# Patient Record
Sex: FEMALE | Race: WHITE | NOT HISPANIC OR LATINO | URBAN - METROPOLITAN AREA
[De-identification: names, ages, dates, MRNs, and addresses within clinical notes are randomized per-mention and may not be internally consistent; named-entity substitution may affect disease eponyms.]

---
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
## 2020-05-05 DIAGNOSIS — Z01.419 ENCOUNTER FOR GYNECOLOGICAL EXAMINATION WITHOUT ABNORMAL FINDING: ICD-10-CM

## 2020-05-06 ENCOUNTER — TELEPHONE (OUTPATIENT)
Dept: OBGYN CLINIC | Facility: CLINIC | Age: 20
End: 2020-05-06

## 2020-05-06 RX ORDER — NORETHINDRONE ACETATE AND ETHINYL ESTRADIOL, AND FERROUS FUMARATE 1MG-20(24)
KIT ORAL
Qty: 84 CAPSULE | Refills: 0 | Status: SHIPPED | OUTPATIENT
Start: 2020-05-06 | End: 2020-06-15 | Stop reason: SDUPTHER

## 2020-06-15 ENCOUNTER — ANNUAL EXAM (OUTPATIENT)
Dept: OBGYN CLINIC | Facility: MEDICAL CENTER | Age: 20
End: 2020-06-15
Payer: COMMERCIAL

## 2020-06-15 VITALS
DIASTOLIC BLOOD PRESSURE: 72 MMHG | BODY MASS INDEX: 20.16 KG/M2 | WEIGHT: 121 LBS | SYSTOLIC BLOOD PRESSURE: 110 MMHG | HEIGHT: 65 IN

## 2020-06-15 DIAGNOSIS — Z01.419 ENCOUNTER FOR GYNECOLOGICAL EXAMINATION WITHOUT ABNORMAL FINDING: Primary | ICD-10-CM

## 2020-06-15 DIAGNOSIS — Z30.41 ENCOUNTER FOR SURVEILLANCE OF CONTRACEPTIVE PILLS: ICD-10-CM

## 2020-06-15 PROCEDURE — 99395 PREV VISIT EST AGE 18-39: CPT | Performed by: NURSE PRACTITIONER

## 2020-06-15 RX ORDER — NORETHINDRONE ACETATE AND ETHINYL ESTRADIOL, AND FERROUS FUMARATE 1MG-20(24)
KIT ORAL
Qty: 84 CAPSULE | Refills: 3 | Status: SHIPPED | OUTPATIENT
Start: 2020-06-15 | End: 2021-06-21 | Stop reason: SDUPTHER

## 2021-01-25 PROBLEM — Z01.419 ENCOUNTER FOR GYNECOLOGICAL EXAMINATION WITHOUT ABNORMAL FINDING: Status: RESOLVED | Noted: 2019-06-10 | Resolved: 2021-01-25

## 2021-01-25 PROBLEM — Z30.41 ENCOUNTER FOR SURVEILLANCE OF CONTRACEPTIVE PILLS: Status: RESOLVED | Noted: 2020-06-15 | Resolved: 2021-01-25

## 2021-01-26 ENCOUNTER — OFFICE VISIT (OUTPATIENT)
Dept: FAMILY MEDICINE CLINIC | Facility: CLINIC | Age: 21
End: 2021-01-26
Payer: COMMERCIAL

## 2021-01-26 ENCOUNTER — APPOINTMENT (OUTPATIENT)
Dept: LAB | Facility: CLINIC | Age: 21
End: 2021-01-26
Payer: COMMERCIAL

## 2021-01-26 VITALS
HEART RATE: 86 BPM | HEIGHT: 65 IN | BODY MASS INDEX: 20.23 KG/M2 | SYSTOLIC BLOOD PRESSURE: 122 MMHG | WEIGHT: 121.4 LBS | RESPIRATION RATE: 18 BRPM | TEMPERATURE: 98.1 F | OXYGEN SATURATION: 99 % | DIASTOLIC BLOOD PRESSURE: 68 MMHG

## 2021-01-26 DIAGNOSIS — R19.7 DIARRHEA, UNSPECIFIED TYPE: Primary | ICD-10-CM

## 2021-01-26 DIAGNOSIS — R63.4 WEIGHT LOSS: ICD-10-CM

## 2021-01-26 DIAGNOSIS — R10.30 LOWER ABDOMINAL PAIN: ICD-10-CM

## 2021-01-26 DIAGNOSIS — R19.7 DIARRHEA, UNSPECIFIED TYPE: ICD-10-CM

## 2021-01-26 LAB
ALBUMIN SERPL BCP-MCNC: 3.8 G/DL (ref 3.5–5)
ALP SERPL-CCNC: 45 U/L (ref 46–116)
ALT SERPL W P-5'-P-CCNC: 32 U/L (ref 12–78)
ANION GAP SERPL CALCULATED.3IONS-SCNC: 6 MMOL/L (ref 4–13)
AST SERPL W P-5'-P-CCNC: 17 U/L (ref 5–45)
BASOPHILS # BLD AUTO: 0.04 THOUSANDS/ΜL (ref 0–0.1)
BASOPHILS NFR BLD AUTO: 1 % (ref 0–1)
BILIRUB SERPL-MCNC: 0.24 MG/DL (ref 0.2–1)
BUN SERPL-MCNC: 10 MG/DL (ref 5–25)
CALCIUM SERPL-MCNC: 9.7 MG/DL (ref 8.3–10.1)
CHLORIDE SERPL-SCNC: 111 MMOL/L (ref 100–108)
CO2 SERPL-SCNC: 25 MMOL/L (ref 21–32)
CREAT SERPL-MCNC: 0.97 MG/DL (ref 0.6–1.3)
EOSINOPHIL # BLD AUTO: 0.03 THOUSAND/ΜL (ref 0–0.61)
EOSINOPHIL NFR BLD AUTO: 1 % (ref 0–6)
ERYTHROCYTE [DISTWIDTH] IN BLOOD BY AUTOMATED COUNT: 12.2 % (ref 11.6–15.1)
GFR SERPL CREATININE-BSD FRML MDRD: 84 ML/MIN/1.73SQ M
GLUCOSE P FAST SERPL-MCNC: 99 MG/DL (ref 65–99)
HCT VFR BLD AUTO: 42.4 % (ref 34.8–46.1)
HGB BLD-MCNC: 13.9 G/DL (ref 11.5–15.4)
IMM GRANULOCYTES # BLD AUTO: 0.02 THOUSAND/UL (ref 0–0.2)
IMM GRANULOCYTES NFR BLD AUTO: 0 % (ref 0–2)
LYMPHOCYTES # BLD AUTO: 3.05 THOUSANDS/ΜL (ref 0.6–4.47)
LYMPHOCYTES NFR BLD AUTO: 47 % (ref 14–44)
MCH RBC QN AUTO: 28.9 PG (ref 26.8–34.3)
MCHC RBC AUTO-ENTMCNC: 32.8 G/DL (ref 31.4–37.4)
MCV RBC AUTO: 88 FL (ref 82–98)
MONOCYTES # BLD AUTO: 0.35 THOUSAND/ΜL (ref 0.17–1.22)
MONOCYTES NFR BLD AUTO: 5 % (ref 4–12)
NEUTROPHILS # BLD AUTO: 2.94 THOUSANDS/ΜL (ref 1.85–7.62)
NEUTS SEG NFR BLD AUTO: 46 % (ref 43–75)
NRBC BLD AUTO-RTO: 0 /100 WBCS
PLATELET # BLD AUTO: 214 THOUSANDS/UL (ref 149–390)
PMV BLD AUTO: 12 FL (ref 8.9–12.7)
POTASSIUM SERPL-SCNC: 3.3 MMOL/L (ref 3.5–5.3)
PROT SERPL-MCNC: 7.1 G/DL (ref 6.4–8.2)
RBC # BLD AUTO: 4.81 MILLION/UL (ref 3.81–5.12)
SODIUM SERPL-SCNC: 142 MMOL/L (ref 136–145)
WBC # BLD AUTO: 6.43 THOUSAND/UL (ref 4.31–10.16)

## 2021-01-26 PROCEDURE — 3008F BODY MASS INDEX DOCD: CPT | Performed by: FAMILY MEDICINE

## 2021-01-26 PROCEDURE — 36415 COLL VENOUS BLD VENIPUNCTURE: CPT

## 2021-01-26 PROCEDURE — 85025 COMPLETE CBC W/AUTO DIFF WBC: CPT

## 2021-01-26 PROCEDURE — 80053 COMPREHEN METABOLIC PANEL: CPT

## 2021-01-26 PROCEDURE — 99204 OFFICE O/P NEW MOD 45 MIN: CPT | Performed by: FAMILY MEDICINE

## 2021-01-26 PROCEDURE — 3725F SCREEN DEPRESSION PERFORMED: CPT | Performed by: FAMILY MEDICINE

## 2021-01-26 PROCEDURE — 1036F TOBACCO NON-USER: CPT | Performed by: FAMILY MEDICINE

## 2021-01-26 NOTE — PROGRESS NOTES
Kathie Pearl 2000 female MRN: [id number]      ASSESSMENT/PLAN  Problem List Items Addressed This Visit     None      Visit Diagnoses     Diarrhea, unspecified type    -  Primary    Relevant Orders    Ova and parasite examination    Clostridium difficile toxin by PCR    Stool culture    Comprehensive metabolic panel    H  pylori antigen, stool    CBC and differential    Lower abdominal pain        Relevant Orders    Ova and parasite examination    Clostridium difficile toxin by PCR    Stool culture    Comprehensive metabolic panel    H  pylori antigen, stool    CBC and differential    Weight loss        Relevant Orders    Ova and parasite examination    Clostridium difficile toxin by PCR    Stool culture    Comprehensive metabolic panel    H  pylori antigen, stool    CBC and differential        Wide differential for abdominal pain/diarrhea including IBD, IBS  No acute abdomen on exam  Pt states her weight is now stable around 120 lbs  Given persistent diarrhea, will check stools studies  If benign, will refer to GI for further evaluation/possible scope  Future Appointments   Date Time Provider Vicky Proctor   1/26/2021  8:40 AM TANISHA TRUJILLO [facility] TANISHA HODGES   6/21/2021  1:00 PM [facility]          SUBJECTIVE  CC: Establish Care and Abdominal Pain (for about 2 months )      HPI:  Kathie Pearl is a 21 y o  female who presents with her Mom to establish care  History reviewed and updated as below  October -- had some "weird pain" in her stomach, went to the ED  It ended up "being nothing"  November -- had stomach pain x1 week  Since then, off and on pain and diarrhea  Has lost some weight (fluctuating 4-5 lb)  Pain occurs across lower abdomen  Pretty much everyday -- sometimes just an hour, sometimes all day  Immediately has to have a BM after eating  No change with certain foods  Pain improves when laying down    No new changes in medication or diet  Started when she was at school, but she is now home at her Dad's house  Sleeping more than usual        Review of Systems   Constitutional: Positive for unexpected weight change  Negative for fever  HENT: Negative for congestion, ear pain, rhinorrhea and sore throat  Eyes: Negative for visual disturbance  Respiratory: Negative for cough and shortness of breath  Cardiovascular: Negative for chest pain, palpitations and leg swelling  Gastrointestinal: Positive for abdominal pain and diarrhea  Negative for blood in stool and constipation  Endocrine: Negative for polyuria  Genitourinary: Negative for dysuria and menstrual problem  Neurological: Negative for dizziness and light-headedness (when she stands up -- gets black vision)  Psychiatric/Behavioral: Negative for sleep disturbance         Historical Information   The patient history was reviewed and updated as follows:    Past Medical History:   Diagnosis Date    Migraine headache      Past Surgical History:   Procedure Laterality Date    EYE SURGERY      FASCIOTOMY Bilateral     legs     Family History   Problem Relation Age of Onset    No Known Problems Mother     Asthma Father     Allergic rhinitis Father     No Known Problems Brother     Heart attack Maternal Grandmother       Social History   Social History     Substance and Sexual Activity   Alcohol Use Yes     Social History     Substance and Sexual Activity   Drug Use No     Social History     Tobacco Use   Smoking Status Never Smoker   Smokeless Tobacco Never Used       Medications:     Current Outpatient Medications:     Norethin Ace-Eth Estrad-FE (Taytulla) 1-20 MG-MCG(24) CAPS, take 1 tablet by mouth once daily, Disp: 84 capsule, Rfl: 3  No Known Allergies    OBJECTIVE    Vitals:   Vitals:    01/26/21 0756   BP: 122/68   BP Location: Left arm   Patient Position: Sitting   Cuff Size: Standard   Pulse: 86   Resp: 18   Temp: 98 1 °F (36 7 °C) SpO2: 99%   Weight: 55 1 kg (121 lb 6 4 oz)   Height: 5' 4 75" (1 645 m)           Physical Exam  Vitals signs and nursing note reviewed  Constitutional:       General: She is not in acute distress  Appearance: Normal appearance  HENT:      Head: Normocephalic and atraumatic  Right Ear: Tympanic membrane and ear canal normal       Left Ear: Tympanic membrane and ear canal normal       Nose: Nose normal       Mouth/Throat:      Mouth: Mucous membranes are moist       Pharynx: No oropharyngeal exudate or posterior oropharyngeal erythema  Eyes:      Conjunctiva/sclera: Conjunctivae normal    Cardiovascular:      Rate and Rhythm: Normal rate and regular rhythm  Pulmonary:      Effort: Pulmonary effort is normal  No respiratory distress  Breath sounds: Normal breath sounds  Abdominal:      General: Bowel sounds are normal  There is no distension  Palpations: Abdomen is soft  Tenderness: There is no abdominal tenderness  Musculoskeletal:      Right lower leg: No edema  Left lower leg: No edema  Lymphadenopathy:      Cervical: No cervical adenopathy  Skin:     General: Skin is warm and dry  Neurological:      General: No focal deficit present  Mental Status: She is alert     Psychiatric:         Mood and Affect: Mood normal                     DO Blayne Fagan [address] [facility]   1/26/2021  8:37 AM

## 2021-01-27 ENCOUNTER — APPOINTMENT (OUTPATIENT)
Dept: LAB | Facility: MEDICAL CENTER | Age: 21
End: 2021-01-27
Payer: COMMERCIAL

## 2021-01-27 DIAGNOSIS — R19.7 DIARRHEA, UNSPECIFIED TYPE: ICD-10-CM

## 2021-01-27 DIAGNOSIS — R63.4 WEIGHT LOSS: ICD-10-CM

## 2021-01-27 DIAGNOSIS — R10.30 LOWER ABDOMINAL PAIN: ICD-10-CM

## 2021-01-27 PROCEDURE — 87177 OVA AND PARASITES SMEARS: CPT

## 2021-01-27 PROCEDURE — 87338 HPYLORI STOOL AG IA: CPT

## 2021-01-27 PROCEDURE — 87209 SMEAR COMPLEX STAIN: CPT

## 2021-01-27 PROCEDURE — 87505 NFCT AGENT DETECTION GI: CPT

## 2021-01-28 DIAGNOSIS — R19.7 DIARRHEA, UNSPECIFIED TYPE: ICD-10-CM

## 2021-01-28 DIAGNOSIS — R10.30 LOWER ABDOMINAL PAIN: Primary | ICD-10-CM

## 2021-01-28 LAB
C DIFF TOX B TCDB STL QL NAA+PROBE: NEGATIVE
CAMPYLOBACTER DNA SPEC NAA+PROBE: NORMAL
H PYLORI AG STL QL IA: NEGATIVE
O+P STL CONC: NORMAL
SALMONELLA DNA SPEC QL NAA+PROBE: NORMAL
SHIGA TOXIN STX GENE SPEC NAA+PROBE: NORMAL
SHIGELLA DNA SPEC QL NAA+PROBE: NORMAL

## 2021-06-21 ENCOUNTER — ANNUAL EXAM (OUTPATIENT)
Dept: OBGYN CLINIC | Facility: MEDICAL CENTER | Age: 21
End: 2021-06-21
Payer: COMMERCIAL

## 2021-06-21 VITALS — BODY MASS INDEX: 20.79 KG/M2 | SYSTOLIC BLOOD PRESSURE: 104 MMHG | DIASTOLIC BLOOD PRESSURE: 70 MMHG | WEIGHT: 124 LBS

## 2021-06-21 DIAGNOSIS — Z01.419 ENCOUNTER FOR GYNECOLOGICAL EXAMINATION (GENERAL) (ROUTINE) WITHOUT ABNORMAL FINDINGS: Primary | ICD-10-CM

## 2021-06-21 DIAGNOSIS — Z01.419 ENCOUNTER FOR GYNECOLOGICAL EXAMINATION WITHOUT ABNORMAL FINDING: ICD-10-CM

## 2021-06-21 DIAGNOSIS — Z30.41 ENCOUNTER FOR SURVEILLANCE OF CONTRACEPTIVE PILLS: ICD-10-CM

## 2021-06-21 PROCEDURE — 99395 PREV VISIT EST AGE 18-39: CPT | Performed by: NURSE PRACTITIONER

## 2021-06-21 PROCEDURE — 1036F TOBACCO NON-USER: CPT | Performed by: NURSE PRACTITIONER

## 2021-06-21 RX ORDER — NORETHINDRONE ACETATE AND ETHINYL ESTRADIOL, AND FERROUS FUMARATE 1MG-20(24)
KIT ORAL
Qty: 84 CAPSULE | Refills: 3 | Status: SHIPPED | OUTPATIENT
Start: 2021-06-21 | End: 2022-05-19 | Stop reason: SDUPTHER

## 2021-06-21 NOTE — PROGRESS NOTES
Encounter for gynecological examination (general) (routine) without abnormal findings  Normal findings on routine annual gyn exam  Discussed adolescent breast health recommendations, breast awareness and self exam  Reviewed ASCCP guidelines and advised baseline pap at age 24  The patient reports she has completed Gardasil series  STI testing was offered and declined at this time; the patient is aware that condoms are recommended for all sexual contact for prevention of STI and she may seek testing at any time  Reviewed diet/activity recommendations and reasons to call  F/u as needed or in one year for routine annual gyn exam      Encounter for surveillance of contraceptive pills    Happy with current OCP  Denies ACHES, breakthrough bleeding, nausea or other side effects  Refill given for one year  RTO 1 year or prn problems or concerns  Diagnoses and all orders for this visit:    Encounter for gynecological examination (general) (routine) without abnormal findings    Encounter for surveillance of contraceptive pills  -     Norethin Ace-Eth Estrad-FE (Taytulla) 1-20 MG-MCG(24) CAPS; take 1 tablet by mouth once daily    Encounter for gynecological examination without abnormal finding         Geraldine Lemon  2000      CC:  Yearly exam    S: Cande Abbott is a  21 y o  female here for yearly exam  She has no complaints  Her cycles are regular, not heavy or painful on OCP  Sexual activity: She is sexually active and uses OCP for contraception  STD testing: She does not want STD testing today       Gardasil: She has completed the Gardasil series        She is attending [facility] in [facility] physical therapy and doing an externship @ [place] [employer] [id number] in [place]       Current Outpatient Medications:     Norethin Ace-Eth Estrad-FE (Taytulla) 1-20 MG-MCG(24) CAPS, take 1 tablet by mouth once daily, Disp: 84 capsule, Rfl: 3  Social History     Socioeconomic History    Marital status: Single Spouse name: Not on file    Number of children: Not on file    Years of education: Not on file    Highest education level: Not on file   Occupational History    Occupation: Student   Tobacco Use    Smoking status: Never Smoker    Smokeless tobacco: Never Used   Substance and Sexual Activity    Alcohol use: Yes    Drug use: No    Sexual activity: Yes     Partners: Male     Birth control/protection: Pill   Other Topics Concern    Not on file   Social History Narrative    Lives with Dad     [address] [place] [address] Strain:     Difficulty of Paying Living Expenses:    Food Insecurity:     Worried About [address] in the Last Year:     [address] in the Last Year:    Transportation Needs:     Lack of Transportation (Medical):  Lack of Transportation (Non-Medical):    Physical Activity:     Days of Exercise per Week:     Minutes of Exercise per Session:    Stress:     Feeling of Stress :    Social Connections:     Frequency of Communication with Friends and Family:     Frequency of Social Gatherings with Friends and Family:     Attends Religion Services:     Active Member of Clubs or Organizations:     Attends Club or Organization Meetings:     Marital Status:    Intimate Partner Violence:     Fear of Current or Ex-Partner:     Emotionally Abused:     Physically Abused:     Sexually Abused:      Family History   Problem Relation Age of Onset    No Known Problems Mother     Asthma Father     Allergic rhinitis Father     No Known Problems Brother     Heart attack Maternal Grandmother      Past Medical History:   Diagnosis Date    Migraine headache        Review of Systems   Constitutional: Negative for appetite change, fatigue and unexpected weight change  Respiratory: Negative for shortness of breath  Cardiovascular: Negative for chest pain and leg swelling     Breasts:  negative for tenderness or masses  Gastrointestinal: Negative for abdominal pain  Endocrine: Negative for cold intolerance and heat intolerance  Genitourinary: Negative for dyspareunia, dysuria, flank pain, frequency, genital sores, hematuria, menstrual problem and pelvic pain  Musculoskeletal: Negative for back pain  Neurological: Negative for headaches  O:  Blood pressure 104/70, weight 56 2 kg (124 lb), last menstrual period 05/25/2021  Patient appears well and is not in distress  ROM normal   Neck is supple without masses  Normal thyroid  Heart regular rate and rhythm  Capillary refill < 2 seconds   Lungs CTA bilaterally   Breasts are symmetrical without mass, tenderness, nipple discharge, skin changes or adenopathy  Abdomen is soft and nontender without masses     Skin warm and dry  Affect normal   Alert and oriented x 3

## 2021-12-21 PROBLEM — Z30.41 ENCOUNTER FOR SURVEILLANCE OF CONTRACEPTIVE PILLS: Status: RESOLVED | Noted: 2021-06-21 | Resolved: 2021-12-21

## 2021-12-21 PROBLEM — Z01.419 ENCOUNTER FOR GYNECOLOGICAL EXAMINATION (GENERAL) (ROUTINE) WITHOUT ABNORMAL FINDINGS: Status: RESOLVED | Noted: 2021-06-21 | Resolved: 2021-12-21

## 2021-12-22 ENCOUNTER — OFFICE VISIT (OUTPATIENT)
Dept: FAMILY MEDICINE CLINIC | Facility: CLINIC | Age: 21
End: 2021-12-22
Payer: COMMERCIAL

## 2021-12-22 VITALS
SYSTOLIC BLOOD PRESSURE: 112 MMHG | BODY MASS INDEX: 20.83 KG/M2 | HEIGHT: 65 IN | WEIGHT: 125 LBS | DIASTOLIC BLOOD PRESSURE: 78 MMHG | HEART RATE: 84 BPM | OXYGEN SATURATION: 96 % | TEMPERATURE: 99.6 F

## 2021-12-22 DIAGNOSIS — F41.9 ANXIETY: Primary | ICD-10-CM

## 2021-12-22 PROCEDURE — 1036F TOBACCO NON-USER: CPT | Performed by: FAMILY MEDICINE

## 2021-12-22 PROCEDURE — 3008F BODY MASS INDEX DOCD: CPT | Performed by: FAMILY MEDICINE

## 2021-12-22 PROCEDURE — 99214 OFFICE O/P EST MOD 30 MIN: CPT | Performed by: FAMILY MEDICINE

## 2021-12-22 PROCEDURE — 3725F SCREEN DEPRESSION PERFORMED: CPT | Performed by: FAMILY MEDICINE

## 2021-12-22 RX ORDER — ESCITALOPRAM OXALATE 5 MG/1
5 TABLET ORAL DAILY
Qty: 30 TABLET | Refills: 1 | Status: SHIPPED | OUTPATIENT
Start: 2021-12-22 | End: 2022-01-14 | Stop reason: SDUPTHER

## 2022-01-13 PROBLEM — F41.9 ANXIETY: Status: ACTIVE | Noted: 2022-01-13

## 2022-01-14 ENCOUNTER — TELEMEDICINE (OUTPATIENT)
Dept: FAMILY MEDICINE CLINIC | Facility: CLINIC | Age: 22
End: 2022-01-14
Payer: COMMERCIAL

## 2022-01-14 DIAGNOSIS — F41.9 ANXIETY: Primary | ICD-10-CM

## 2022-01-14 PROCEDURE — 1036F TOBACCO NON-USER: CPT | Performed by: FAMILY MEDICINE

## 2022-01-14 PROCEDURE — 99214 OFFICE O/P EST MOD 30 MIN: CPT | Performed by: FAMILY MEDICINE

## 2022-01-14 RX ORDER — ESCITALOPRAM OXALATE 10 MG/1
10 TABLET ORAL DAILY
Qty: 30 TABLET | Refills: 1 | Status: SHIPPED | OUTPATIENT
Start: 2022-01-14 | End: 2022-02-22 | Stop reason: SDUPTHER

## 2022-01-14 NOTE — PROGRESS NOTES
Virtual Regular Visit    Verification of patient location:    Patient is located in the following state in which I hold an active license [place]      Assessment/Plan:    Problem List Items Addressed This Visit        Other    Anxiety - Primary    Relevant Medications    escitalopram (LEXAPRO) 10 mg tablet        Anxiety -- symptoms persistent; trial increase in Lexapro to 10 mg daily and f/u in 4 weeks to monitor       Reason for visit is   Chief Complaint   Patient presents with    Anxiety     4 week check in on lexapro   Virtual Regular Visit    Virtual Brief Visit        Encounter provider Michelle Otto DO    Provider located at [place] [address] [place] [id number]      Recent Visits  No visits were found meeting these conditions  Showing recent visits within past 7 days and meeting all other requirements  Today's Visits  Date Type Provider Dept   01/14/22 Telemedicine Michelle Otto DO [facility]   Showing today's visits and meeting all other requirements  Future Appointments  No visits were found meeting these conditions  Showing future appointments within next 150 days and meeting all other requirements       The patient was identified by name and date of birth  Sravani Martinez was informed that this is a telemedicine visit and that the visit is being conducted through Telephone  My office door was closed  No one else was in the room  She acknowledged consent and understanding of privacy and security of the video platform  The patient has agreed to participate and understands they can discontinue the visit at any time  It was my intent to perform this visit via video technology but the patient was not able to do a video connection so the visit was completed via audio telephone only  Patient is aware this is a billable service  Honey Carter is a 24 y o  female for medication follow up      HPI     Anxiety: Initiated on Lexapro -- tolerating without issue (no GI upset, sleep disturbance)  Doesn't note much improvement in symptoms  Past Medical History:   Diagnosis Date    Migraine headache        Past Surgical History:   Procedure Laterality Date    EYE SURGERY      FASCIOTOMY Bilateral     legs       Current Outpatient Medications   Medication Sig Dispense Refill    escitalopram (LEXAPRO) 10 mg tablet Take 1 tablet (10 mg total) by mouth daily 30 tablet 1    Norethin Ace-Eth Estrad-FE (Taytulla) 1-20 MG-MCG(24) CAPS take 1 tablet by mouth once daily 84 capsule 3     No current facility-administered medications for this visit  No Known Allergies    Review of Systems   Gastrointestinal: Negative for abdominal pain, diarrhea, nausea and vomiting  Psychiatric/Behavioral: Negative for sleep disturbance  The patient is nervous/anxious  Video Exam    There were no vitals filed for this visit  Physical Exam   No PE due to telephone only exam     I spent 5 minutes directly with the patient during this visit    VIRTUAL VISIT [facility] [address] verbally agrees to participate in [employer]  Pt is aware that [employer] could be limited without vital signs or the ability to perform a full hands-on physical exam  Geraldine Lemon understands she or the provider may request at any time to terminate the video visit and request the patient to seek care or treatment in person

## 2022-01-14 NOTE — PROGRESS NOTES
Virtual Regular Visit    Verification of patient location:    Patient is located in the following state in which I hold an active license { amb virtual patient location:[address]}      Assessment/Plan:    Problem List Items Addressed This Visit        Other    Anxiety - Primary               Reason for visit is   Chief Complaint   Patient presents with    Anxiety     4 week check in on lexapro   Virtual Regular Visit        Encounter provider Misbah Sears DO    Provider located at [place] [address] [address] [place] [phone]      Recent Visits  No visits were found meeting these conditions  Showing recent visits within past 7 days and meeting all other requirements  Today's Visits  Date Type Provider Dept   01/14/22 Telemedicine Misbah Sears DO [facility]   Showing today's visits and meeting all other requirements  Future Appointments  No visits were found meeting these conditions  Showing future appointments within next 150 days and meeting all other requirements       The patient was identified by name and date of birth  Seth Castaneda was informed that this is a telemedicine visit and that the visit is being conducted through {AMB VIRTUAL VISIT APQETT:39735}  {Telemedicine confidentiality :02923} {Telemedicine participants:03596}  She acknowledged consent and understanding of privacy and security of the video platform  The patient has agreed to participate and understands they can discontinue the visit at any time  Patient is aware this is a billable service  Danna Hernandez is a 24 y o  female ***         HPI     Past Medical History:   Diagnosis Date    Migraine headache        Past Surgical History:   Procedure Laterality Date    EYE SURGERY      FASCIOTOMY Bilateral     legs       Current Outpatient Medications   Medication Sig Dispense Refill    escitalopram (LEXAPRO) 5 mg tablet Take 1 tablet (5 mg total) by mouth daily 30 tablet 1    Norethin Ace-Eth Estrad-FE (Taytulla) 1-20 MG-MCG(24) CAPS take 1 tablet by mouth once daily 84 capsule 3     No current facility-administered medications for this visit  No Known Allergies    Review of Systems    Video Exam    There were no vitals filed for this visit  Physical Exam     {Time Spent:90363}    VIRTUAL VISIT DISCLAIMER      Geraldine Lemon verbally agrees to participate in [employer]  Pt is aware that [employer] could be limited without vital signs or the ability to perform a full hands-on physical exam  Geraldine Lemon understands she or the provider may request at any time to terminate the video visit and request the patient to seek care or treatment in person

## 2022-01-14 NOTE — PROGRESS NOTES
Virtual Brief Visit    Patient is located in the following state in which I hold an active license { amb virtual patient location:[id number]}      Assessment/Plan:    Problem List Items Addressed This Visit        Other    Anxiety - Primary          Recent Visits  No visits were found meeting these conditions  Showing recent visits within past 7 days and meeting all other requirements  Today's Visits  Date Type Provider Dept   01/14/22 Telemedicine DO David Mares   Showing today's visits and meeting all other requirements  Future Appointments  No visits were found meeting these conditions    Showing future appointments within next 150 days and meeting all other requirements         {Time Spent:18789}

## 2022-02-22 DIAGNOSIS — F41.9 ANXIETY: ICD-10-CM

## 2022-02-22 NOTE — TELEPHONE ENCOUNTER
Patient states she was due to f/u for a visit regarding her prescription - does not feel she needs to come in to follow up at this time states medication is working well for her   Requesting a refill on prescription

## 2022-02-23 RX ORDER — ESCITALOPRAM OXALATE 10 MG/1
10 TABLET ORAL DAILY
Qty: 90 TABLET | Refills: 1 | Status: SHIPPED | OUTPATIENT
Start: 2022-02-23 | End: 2022-03-03 | Stop reason: SDUPTHER

## 2022-03-03 DIAGNOSIS — F41.9 ANXIETY: ICD-10-CM

## 2022-03-03 RX ORDER — ESCITALOPRAM OXALATE 10 MG/1
10 TABLET ORAL DAILY
Qty: 90 TABLET | Refills: 1 | Status: SHIPPED | OUTPATIENT
Start: 2022-03-03

## 2022-05-14 DIAGNOSIS — Z30.41 ENCOUNTER FOR SURVEILLANCE OF CONTRACEPTIVE PILLS: ICD-10-CM

## 2022-05-19 DIAGNOSIS — Z30.41 ENCOUNTER FOR SURVEILLANCE OF CONTRACEPTIVE PILLS: ICD-10-CM

## 2022-05-19 RX ORDER — NORETHINDRONE ACETATE AND ETHINYL ESTRADIOL, AND FERROUS FUMARATE 1MG-20(24)
KIT ORAL
Qty: 84 CAPSULE | Refills: 0 | Status: SHIPPED | OUTPATIENT
Start: 2022-05-19 | End: 2022-07-12 | Stop reason: SDUPTHER

## 2022-05-19 NOTE — TELEPHONE ENCOUNTER
Contacted patient to reschedule annual  Annual exam set for 7/14/22 with Piedad Moran  Patient states she ran out of bc pills, has been out of pills for 1 day   Please advise    [place] on [facility]

## 2022-05-24 RX ORDER — NORETHINDRONE ACETATE AND ETHINYL ESTRADIOL, AND FERROUS FUMARATE 1MG-20(24)
KIT ORAL
Qty: 84 CAPSULE | Refills: 3 | OUTPATIENT
Start: 2022-05-24

## 2022-07-12 NOTE — PATIENT INSTRUCTIONS
Breast Self Exam for Women   AMBULATORY CARE:   A breast self-exam (BSE)  is a way to check your breasts for lumps and other changes  Regular BSEs can help you know how your breasts normally look and feel  Most breast lumps or changes are not cancer, but you should always have them checked by a healthcare provider  Why you should do a BSE:  Breast cancer is the most common type of cancer in women  Even if you have mammograms, you may still want to do a BSE regularly  If you know how your breasts normally feel and look, it may help you know when to contact your healthcare provider  Mammograms can miss some cancers  You may find a lump during a BSE that did not show up on a mammogram   When you should do a BSE:  If you have periods, you may want to do your BSE 1 week after your period ends  This is the time when your breasts may be the least swollen, lumpy, or tender  You can do regular BSEs even if you are breastfeeding or have breast implants  Call your doctor if:   You find any lumps or changes in your breasts  You have breast pain or fluid coming from your nipples  You have questions or concerns about your condition or care  How to do a BSE:       Look at your breasts in a mirror  Look at the size and shape of each breast and nipple  Check for swelling, lumps, dimpling, scaly skin, or other skin changes  Look for nipple changes, such as a nipple that is painful or beginning to pull inward  Gently squeeze both nipples and check to see if fluid (that is not breast milk) comes out of them  If you find any of these or other breast changes, contact your healthcare provider  Check your breasts while you sit or  the following 3 positions:    [place] your arms down at your sides  Raise your hands and join them behind your head  Put firm pressure with your hands on your hips  Bend slightly forward while you look at your breasts in the mirror  Lie down and feel your breasts    When you lie down, your breast tissue spreads out evenly over your chest  This makes it easier for you to feel for lumps and anything that may not be normal for your breasts  Do a BSE on one breast at a time  Place a small pillow or towel under your left shoulder  Put your left arm behind your head  Use the 3 middle fingers of your right hand  Use your fingertip pads, on the top of your fingers  Your fingertip pad is the most sensitive part of your finger  Use small circles to feel your breast tissue  Use your fingertip pads to make dime-sized, overlapping circles on your breast and armpits  Use light, medium, and firm pressure  First, press lightly  Second, press with medium pressure to feel a little deeper into the breast  Last, use firm pressure to feel deep within your breast     Examine your entire breast area  Examine the breast area from above the breast to below the breast where you feel only ribs  Make small circles with your fingertips, starting in the middle of your armpit  Make circles going up and down the breast area  Continue toward your breast and all the way across it  Examine the area from your armpit all the way over to the middle of your chest (breastbone)  Stop at the middle of your chest     Move the pillow or towel to your right shoulder, and put your right arm behind your head  Use the 3 fingertip pads of your left hand, and repeat the above steps to do a BSE on your right breast     What else you can do to check for breast problems or cancer:  Talk to your healthcare provider about mammograms  A mammogram is an x-ray of your breasts to screen for breast cancer or other problems  Your provider can tell you the benefits and risks of mammograms  The first mammogram is usually at age 39 or 48  Your provider may recommend you start at 36 or younger if your risk for breast cancer is high  Mammograms usually continue every 1 to 2 years until age 76         Follow up with your doctor as directed:  Write down your questions so you remember to ask them during your visits  © Copyright [employer] 2022 Information is for End User's use only and may not be sold, redistributed or otherwise used for commercial purposes  All illustrations and images included in CareNotes® are the copyrighted property of [employer] D A M , Inc  or Nely Haider  The above information is an  only  It is not intended as medical advice for individual conditions or treatments  Talk to your doctor, nurse or pharmacist before following any medical regimen to see if it is safe and effective for you  Wellness Visit for Adults   AMBULATORY CARE:   A wellness visit  is when you see your healthcare provider to get screened for health problems  Your healthcare provider will also give you advice on how to stay healthy  Write down your questions so you remember to ask them  Ask your healthcare provider how often you should have a wellness visit  What happens at a wellness visit:  Your healthcare provider will ask about your health, and your family history of health problems  This includes high blood pressure, heart disease, and cancer  He or she will ask if you have symptoms that concern you, if you smoke, and about your mood  You may also be asked about your intake of medicines, supplements, food, and alcohol  Any of the following may be done: Your weight  will be checked  Your height may also be checked so your body mass index (BMI) can be calculated  Your BMI shows if you are at a healthy weight  Your blood pressure  and heart rate will be checked  Your temperature may also be checked  Blood and urine tests  may be done  Blood tests may be done to check your cholesterol levels  Abnormal cholesterol levels increase your risk for heart disease and stroke  You may also need a blood or urine test to check for diabetes if you are at increased risk  Urine tests may be done to look for signs of an infection or kidney disease      A physical exam  includes checking your heartbeat and lungs with a stethoscope  Your healthcare provider may also check your skin to look for sun damage  Screening tests  may be recommended  A screening test is done to check for diseases that may not cause symptoms  The screening tests you may need depend on your age, gender, family history, and lifestyle habits  For example, colorectal screening may be recommended if you are 48years old or older  Screening tests you need if you are a woman:   A Pap smear  is used to screen for cervical cancer  Pap smears are usually done every 3 to 5 years depending on your age  You may need them more often if you have had abnormal Pap smear test results in the past  Ask your healthcare provider how often you should have a Pap smear  A mammogram  is an x-ray of your breasts to screen for breast cancer  Experts recommend mammograms every 2 years starting at age 48 years  You may need a mammogram at age 52 years or younger if you have an increased risk for breast cancer  Talk to your healthcare provider about when you should start having mammograms and how often you need them  Vaccines you may need:   Get an influenza vaccine  every year  The influenza vaccine protects you from the flu  Several types of viruses cause the flu  The viruses change over time, so new vaccines are made each year  Get a tetanus-diphtheria (Td) booster vaccine  every 10 years  This vaccine protects you against tetanus and diphtheria  Tetanus is a severe infection that may cause painful muscle spasms and lockjaw  Diphtheria is a severe bacterial infection that causes a thick covering in the back of your mouth and throat  Get a human papillomavirus (HPV) vaccine  if you are female and aged 23 to 32 or male 23 to 24 and never received it  This vaccine protects you from HPV infection  HPV is the most common infection spread by sexual contact   HPV may also cause vaginal, penile, and anal cancers  Get a pneumococcal vaccine  if you are aged 72 years or older  The pneumococcal vaccine is an injection given to protect you from pneumococcal disease  Pneumococcal disease is an infection caused by pneumococcal bacteria  The infection may cause pneumonia, meningitis, or an ear infection  Get a shingles vaccine  if you are 60 or older, even if you have had shingles before  The shingles vaccine is an injection to protect you from the varicella-zoster virus  This is the same virus that causes chickenpox  Shingles is a painful rash that develops in people who had chickenpox or have been exposed to the virus  How to eat healthy:  My Plate is a model for planning healthy meals  It shows the types and amounts of foods that should go on your plate  Fruits and vegetables make up about half of your plate, and grains and protein make up the other half  A serving of dairy is included on the side of your plate  The amount of calories and serving sizes you need depends on your age, gender, weight, and height  Examples of healthy foods are listed below:  Eat a variety of vegetables  such as dark green, red, and orange vegetables  You can also include canned vegetables low in sodium (salt) and frozen vegetables without added butter or sauces  Eat a variety of fresh fruits , canned fruit in 100% juice, frozen fruit, and dried fruit  Include whole grains  At least half of the grains you eat should be whole grains  Examples include whole-wheat bread, wheat pasta, brown rice, and whole-grain cereals such as oatmeal     Eat a variety of protein foods such as seafood (fish and shellfish), lean meat, and poultry without skin (turkey and chicken)  Examples of lean meats include pork leg, shoulder, or tenderloin, and beef round, sirloin, tenderloin, and extra lean ground beef  Other protein foods include eggs and egg substitutes, beans, peas, soy products, nuts, and seeds      Choose low-fat dairy products such as skim or 1% milk or low-fat yogurt, cheese, and cottage cheese  Limit unhealthy fats  such as butter, hard margarine, and shortening  Exercise:  Exercise at least 30 minutes per day on most days of the week  Some examples of exercise include walking, biking, dancing, and swimming  You can also fit in more physical activity by taking the stairs instead of the elevator or parking farther away from stores  Include muscle strengthening activities 2 days each week  Regular exercise provides many health benefits  It helps you manage your weight, and decreases your risk for type 2 diabetes, heart disease, stroke, and high blood pressure  Exercise can also help improve your mood  Ask your healthcare provider about the best exercise plan for you  General health and safety guidelines:   Do not smoke  Nicotine and other chemicals in cigarettes and cigars can cause lung damage  Ask your healthcare provider for information if you currently smoke and need help to quit  E-cigarettes or smokeless tobacco still contain nicotine  Talk to your healthcare provider before you use these products  Limit alcohol  A drink of alcohol is 12 ounces of beer, 5 ounces of wine, or 1½ ounces of liquor  Lose weight, if needed  Being overweight increases your risk of certain health conditions  These include heart disease, high blood pressure, type 2 diabetes, and certain types of cancer  Protect your skin  Do not sunbathe or use tanning beds  Use sunscreen with a SPF 15 or higher  Apply sunscreen at least 15 minutes before you go outside  Reapply sunscreen every 2 hours  Wear protective clothing, hats, and sunglasses when you are outside  Drive safely  Always wear your seatbelt  Make sure everyone in your car wears a seatbelt  A seatbelt can save your life if you are in an accident  Do not use your cell phone when you are driving  This could distract you and cause an accident   Pull over if you need to make a call or send a text message  Practice safe sex  Use latex condoms if are sexually active and have more than one partner  Your healthcare provider may recommend screening tests for sexually transmitted infections (STIs)  Wear helmets, lifejackets, and protective gear  Always wear a helmet when you ride a bike or motorcycle, go skiing, or play sports that could cause a head injury  Wear protective equipment when you play sports  Wear a lifejacket when you are on a boat or doing water sports  © Copyright [employer] 2022 Information is for End User's use only and may not be sold, redistributed or otherwise used for commercial purposes  All illustrations and images included in CareNotes® are the copyrighted property of A D A M , Inc  or Nely Beebe   The above information is an  only  It is not intended as medical advice for individual conditions or treatments  Talk to your doctor, nurse or pharmacist before following any medical regimen to see if it is safe and effective for you  HPV (Human Papillomavirus) Vaccine for Adults   AMBULATORY CARE:   The human papillomavirus (HPV) vaccine  is an injection given to females and males to protect against human papillomavirus infection  HPV is most commonly spread through sexual activity  It can also be spread from a mother to her baby during delivery  The HPV vaccine is most effective if given before sexual activity begins  This allows your body to build almost complete protection against HPV before you have contact with the virus  The HPV vaccine is still effective after sexual activity has begun  How the vaccine is given:  The HPV vaccine can be given with other vaccines  The vaccine is given in 2 or 3 doses through age 32: The first dose  is given at any time  The second dose  is given 1 to 2 months after the first dose  The third dose, if needed,  is given 6 months after the first dose      Reasons you should not get the HPV vaccine, or should wait to get it: You had a severe allergic reaction to a dose of the vaccine  You are pregnant  Your healthcare provider will tell you when you can get the vaccine  You are sick or have a fever  You may need to wait to get the vaccine until symptoms go away  Risks of the HPV vaccine: You may have pain, redness, or swelling where the shot was given  You may have a fever or headache  You may have an allergic reaction to the vaccine  This can be life-threatening  Call your local emergency number (911 in the [address] [facility]) if:   You have signs of a severe allergic reaction, such as trouble breathing, hives, or wheezing  Seek care immediately if:   You have a high fever or behavior changes that concern you  Call your doctor if:   You have questions or concerns about the HPV vaccine  Apply a warm compress  to the area to relieve swelling and pain  Follow up with your doctor as directed:  Write down your questions so you remember to ask them during your visits  © Copyright [employer] 2022 Information is for End User's use only and may not be sold, redistributed or otherwise used for commercial purposes  All illustrations and images included in CareNotes® are the copyrighted property of A D A M , Inc  or [facility] [employer]   The above information is an  only  It is not intended as medical advice for individual conditions or treatments  Talk to your doctor, nurse or pharmacist before following any medical regimen to see if it is safe and effective for you  Safe Sex Practices   AMBULATORY CARE:   Safe sex practices  are ways to prevent pregnancy and the spread of sexually transmitted infections (STIs)  An STI happens when a virus or bacteria are spread through sexual activity  Safe sex practices help decrease or prevent body fluid exchange during sex  Body fluids include saliva, urine, blood, vaginal fluids, and semen  Oral, vaginal, and anal sex can all spread STIs    Seek care immediately if:   A condom breaks, leaks, or slips off while you are having sex  You notice sores on your penis, vagina, anal area, or skin around them  You have had unsafe sex and want to discuss emergency contraception or treatment for STI exposure  Call your doctor if:   You or your female sex partner might be pregnant  You have questions or concerns about your condition or care  Safe sex practices to follow before you have sex:   Talk to a new partner before you have sex  Tell your partner if you have an STI  Ask about his or her sex history and if he or she has a current or past STI  Your partner may need to be tested and treated  Do not have sex while you are being treated for an STI, or with a partner who is being treated  Limit your number of sex partners  More than one sex partner can increase your risk for an STI  Do not have sex with anyone whose sex history you do not know  Get tested for STIs if needed  Get tested if you had sex with someone who has an STI  Get tested if you have unprotected sex with any new partner  Talk to your healthcare provider about birth control  Birth control can help prevent an unwanted pregnancy  There are many different types of birth control  Talk to your healthcare provider about which birth control method is right for you  Ask about medicines to lower your risk for some STIs:      Vaccines  can help protect you from hepatitis A, hepatitis B, and the human papillomavirus (HPV)  The HPV vaccine is usually given at 11 years, but it may be given through 26 years to both females and males  Your provider can give you more information on vaccines to prevent STIs  Pre-exposure prophylaxis (PrEP)  may be given if you are at high risk for HIV  PrEP is taken every day to prevent the virus from fully infecting the body  Do not use alcohol or drugs before sex    These can prevent you from thinking clearly and increase your risk for unsafe sex     Safe sex practices to follow while you are having sex:   Use condoms and barrier methods for all types of sexual contact  This includes oral, vaginal, and anal sex  Male and female condoms are available  Make sure that the condom fits and is put on correctly  Rubber latex sheets or dental dams can be used for oral sex  Use a new condom or latex barrier each time you have sex  Use latex condoms, if possible  Lambskin or natural condoms do not prevent STIs  If you or your partner is allergic to latex, use a nonlatex product, such as polyurethane  Use a second form of birth control with the condom to prevent pregnancy and STIs  Do not use male and female condoms together  Only use water-based lubricants during sex  Water-based lubricants help prevent sores or cuts in the vagina or on the penis  Prevent sores or cuts to decrease your risk for an STI  Do not use oil-based lubricants, such as baby oil or hand lotion, with latex condoms or barriers  These will weaken the latex and may cause the condom to break  Do not use chemicals that irritate your skin  Products that contain chemical irritants, such as spermicides, can irritate the lining of your vagina or rectum  Irritation may cause sores that can increase your risk for an STI  Be careful when you have sex if you have open sores or cuts  Open sores or cuts may increase your risk for an STI  Keep all open sores or cuts covered during sex  Do not have oral sex if you have cuts or sores in your mouth  Do not do activities that can pass germs  Do not use saliva as a lubricant or share sex toys  Follow up with your doctor as directed:  Write down your questions so you remember to ask them during your visits  © Copyright [employer] 2022 Information is for End User's use only and may not be sold, redistributed or otherwise used for commercial purposes   All illustrations and images included in CareNotes® are the copyrighted property of [employer]  or [address] [facility]  The above information is an  only  It is not intended as medical advice for individual conditions or treatments  Talk to your doctor, nurse or pharmacist before following any medical regimen to see if it is safe and effective for you  Perineal Hygiene     No soaps or feminine wash to the vulva  Use only water to cleanse, or water with D[employer] or [employer] if necessary  No lotion to the area  Use only coconut oil for moisture if needed   No douching     [place] underware, loose fitting clothing  Only perfume-free, dye-free laundry detergent, use a second rinse cycle   Avoid fabric softeners/dryer sheets  Coconut oil as a lubricant (if not using condoms) or another scent-free lubricant (Astroglide, Uberlube) if needed  Partner to avoid the same products as well  Over the counter probiotic to restore vaginal tammy may be helpful as well     You may also look into Boric Acid vaginal suppositories to restore vaginal PH balance for up to 2 weeks as directed on the box  You may not use these if you are pregnant     Birth Control Pills     Birth control pills  are also called oral contraceptives, or the pill  It is medicine that helps prevent pregnancy by stopping ovulation  Ovulation is when the ovaries make and release an egg cell each month  If this egg gets fertilized by sperm, pregnancy occurs  You will need to take the pill at the same time every day  Your healthcare provider will tell you when to start taking the pill  You will also be told what to do if you miss a dose  Instructions will depend on the kind of birth control pills you are taking  Different kinds of birth control pills:  Some kinds are taken for 21 days in a row, followed by 7 days of placebo (no hormones) pills  Other kinds are taken for 24 days followed by 4 days of placebos  Each kind has a certain amount of female hormones   Your provider will decide on the kind that is best for you based on your age and other health conditions  Call your local emergency number (911 in the [address]) for any of the following: You have any of the following signs of a stroke:      Numbness or drooping on one side of your face     Weakness in an arm or leg    Confusion or difficulty speaking    Dizziness, a severe headache, or vision loss    You feel lightheaded, short of breath, and have chest pain  You cough up blood  Seek care immediately if:   Your arm or leg feels warm, tender, and painful  It may look swollen and red  You have severe pain, numbness, or swelling in your arms or legs  Contact your healthcare provider if:   You have forgotten to take a birth control pill  You have mood changes, such as depression, since starting birth control pills  You have nausea or are vomiting  You have severe abdominal pain  You missed a period and have questions or concerns about being pregnant  You still have bleeding 4 months after taking birth control pills correctly  You have questions or concerns about your condition or care  What may be done before you can start taking birth control pills: You need to see your healthcare provider to get a prescription  Any of the following may be done before your healthcare provider gives you a prescription:  Your healthcare provider will ask about diseases and illnesses you have had in the past  Your provider will check your risk for blood clots, heart conditions, or stroke  Tell your provider if you had gastric bypass surgery  This surgery can affect the way your body absorbs medicines such as birth control pills  Your provider will also check your blood pressure, and may do a breast and pelvic exam  A Pap smear may also be done during the pelvic exam  This is a test to make sure you do not have abnormal changes on your cervix  You may need other tests, such as a urine test to make sure you are not pregnant      Your provider will ask if you take any medicines and if you smoke  Smoking increases your risk for stroke, heart attack, or a blood clot in your lungs  If you smoke, you should not take certain kinds of birth control pills  Advantages of birth control pills:  When birth control pills are used correctly, the chances of getting pregnant are very low  Birth control pills may help decrease bleeding and pain during your monthly period  They may also help prevent cancer of the uterus and ovaries  Disadvantages of birth control pills: You may have sudden changes in your mood or feelings while you take birth control pills  You may have nausea and a decreased sex drive  You may have an increased appetite and rapid weight gain  You may also have bleeding in between periods, less frequent periods, vaginal dryness, and breast pain  Birth control pills will not protect you from sexually transmitted infections  Rarely, some birth control pills can increase your risk for a blood clot  This may become life-threatening  If you decide you want to get pregnant: If you are planning to have a baby, ask your healthcare provider when you may stop taking your birth control pills  It may take some time for you to start ovulating again  Ask your healthcare provider for more information about pregnancy after birth control pills  When to start taking birth control pills after you have a baby: If you are not breastfeeding, you may start taking birth control pills 3 weeks after you give birth  You may be able to take certain types of birth control pills if you are breastfeeding  These pills can be started from 6 weeks to 6 months after you give birth  Ask your healthcare provider for more information about when to start taking birth control pills after you give birth  What you need to know about birth control pills and menopause:   Talk with your healthcare provider if you want to take birth control pills around menopause       Around age 39, you will enter into perimenopause  This means your hormone levels are dropping and you are ovulating less often  You can still become pregnant during this time  The risk for problems, such as miscarriage, are higher if you become pregnant after age 39  Birth control pills will prevent pregnancy, and may also help prevent or relieve some signs and symptoms of menopause  Examples are hot flashes and mood swings  Your provider will do tests when you are around age 48  The tests may show that you are in menopause  If the tests do not show menopause for sure, you may be able to continue taking the pill up to age 54  The decision will depend on your health and if you have any medical conditions, such as a blood clot  Do not smoke:  Nicotine and other chemicals in cigarettes and cigars increase your risk for a blood clot while you use birth control pills  The risk is higher if you are also 35 or older  Ask your healthcare provider for information if you currently smoke and need help to quit  E-cigarettes or smokeless tobacco still contain nicotine  Talk to your healthcare provider before you use these products  Follow up with your healthcare provider as directed:  Write down your questions so you remember to ask them during your visits  © Copyright [address] [place] [address] Information is for End User's use only and may not be sold, redistributed or otherwise used for commercial purposes  All illustrations and images included in CareNotes® are the copyrighted property of [employer] , [employer]  or Nely Haider  The above information is an  only  It is not intended as medical advice for individual conditions or treatments  Talk to your doctor, nurse or pharmacist before following any medical regimen to see if it is safe and effective for you

## 2022-07-12 NOTE — PROGRESS NOTES
Diagnoses and all orders for this visit:    Encounter for gynecological examination without abnormal finding    Encounter for surveillance of contraceptive pills        Health Maintenance:    Last PAP: Not on file  Next PAP Due:    Last Mammogram: Not on file  advised age 36  Next Mammogram: order given    Last Colonoscopy: Not on file    advised age 39    Gardisil: Completed / Not completed     Subjective    CC: Yearly Exam      Claudeen Shin is a 24 y o  female here for an annual exam  Brenda Knight  GYN hx includes:  No personal Hx of breast, cervical, ovarian or colon CA  Family hx of: *** No GYN cancers  Medically stable, reports no changes in medical Hx, follows with PMD    No LMP recorded  Her menstrual cycles are {Frequencies; period menses:715}  She {Actions; denies-reports:170972} issues with bleeding or her menses  {Actions; denies-reports:78895} history of abnormal pap smear  She denies breast concerns, abnormal vaginal discharge, vaginal itching, odor, irritation, bowel/bladder dysfunction, urinary symptoms, pelvic pain, or dyspareunia today  She {IS/ IS NOT:10605} sexually active  Monogamous relationship  Her current method of contraception includes {Contraception Options:88544}  Denies any issues with her [facility]  Dontrell Medeiros She {ACTIONS; DOES/DOES ZTA:25992} want STD testing today    Denies intimate partner violence    Past Medical History:   Diagnosis Date    Migraine headache      Past Surgical History:   Procedure Laterality Date    EYE SURGERY      FASCIOTOMY Bilateral     legs       Immunization History   Administered Date(s) Administered    HPV9 12/11/2017    Influenza Injectable, MDCK, Preservative Free, Quadrivalent, 0 5 mL 10/22/2020    Tdap 09/05/2017       Family History   Problem Relation Age of Onset    No Known Problems Mother     Asthma Father     Allergic rhinitis Father     No Known Problems Brother     Heart attack Maternal Grandmother      Social History     Tobacco Use    Smoking status: Never Smoker    Smokeless tobacco: Never Used   Substance Use Topics    Alcohol use: Yes    Drug use: No       Current Outpatient Medications:     Norethin Ace-Eth Estrad-FE (Taytulla) 1-20 MG-MCG(24) CAPS, take 1 tablet by mouth once daily, Disp: 84 capsule, Rfl: 0    escitalopram (LEXAPRO) 10 mg tablet, Take 1 tablet (10 mg total) by mouth daily, Disp: 90 tablet, Rfl: 1  Patient Active Problem List    Diagnosis Date Noted    Anxiety 2022       No Known Allergies    OB History    Para Term  AB Living   0 0 0 0 0 0   SAB IAB Ectopic Multiple Live Births   0 0 0 0 0       There were no vitals filed for this visit  There is no height or weight on file to calculate BMI  Review of Systems     Constitutional: Negative for chills, fatigue, fever, headaches, visual disturbances, and unexpected weight change  Respiratory: Negative for cough, & shortness of breath  Cardiovascular: Negative for chest pain       Gastrointestinal: Negative for Abd pain, nausea & vomiting, constipation and diarrhea  Genitourinary: Negative for difficulty urinating, dysuria, hematuria, dyspareunia, unusual vaginal bleeding or discharge  Skin: Negative skin changes    Physical Exam     Constitutional: Alert & Oriented x3, well-developed and well-nourished  No distress  HENT: Atraumatic, Normocephalic, Conjunctivae clear  Neck: Normal range of motion  Neck supple  No thyromegaly, mass, nodules or tenderness  Pulmonary: Effort normal  Lungs clear to ascultation bilateral  Cardiac: RRR, no murmur   Abdominal: Soft  No tenderness or masses  Musculoskeletal: Normal ROM  Skin: Warm & Dry  Psychological: Normal mood, thought content, behavior & judgement     Breasts:   Right: tissue soft without masses, tenderness, skin changes or nipple discharge  No areas of erythema or pain   No subclavicular, axillary, pectoral adenopathy  Left:  tissue soft without masses, tenderness, skin changes or nipple discharge  No areas of erythema or pain  No subclavicular, axillary, pectoral adenopathy    Pelvic exam was performed with patient supine, lithotomy position  Labia: Negative rash, tenderness, lesion or injury on the right labia  Negative rash, tenderness, lesion or injury on the left labia  Urethral meatus:  Negative for  tenderness, inflammation or discharge  Uterus: not deviated, enlarged, fixed or tender  Cervix: No CMT, no discharge or friability  Right adnexa: no mass, no tenderness and no fullness  Left adnexa: no mass, no tenderness and no fullness  Vagina: No erythema, tenderness, masses, or foreign body in the vagina  No signs of injury around the vagina  No unusual vaginal discharge   Perineum without lesions, signs of injury, erythema or swelling  Inguinal Canal:        Right: No inguinal adenopathy or hernia present  Left: No inguinal adenopathy or hernia present  OBGyn Exam    Perineal hygiene reviewed   Weight bearing exercises minium of 150 mins/weekly advised  Kegel exercises recommended  SBE encouraged, ASCCP guidelines reviewed  Condoms encouraged with all sexual activity to prevent STI's  Gardisil vaccines recommended up to age 39  Calcium/ Vit D dietary requirements discussed,   Advised to call with any issues,  all concerns & questions addressed     See provided information in your after visit summary     F/U Annually and PRN

## 2022-07-14 ENCOUNTER — ANNUAL EXAM (OUTPATIENT)
Dept: OBGYN CLINIC | Facility: MEDICAL CENTER | Age: 22
End: 2022-07-14
Payer: COMMERCIAL

## 2022-07-14 VITALS
SYSTOLIC BLOOD PRESSURE: 120 MMHG | HEIGHT: 64 IN | DIASTOLIC BLOOD PRESSURE: 70 MMHG | BODY MASS INDEX: 22.4 KG/M2 | WEIGHT: 131.2 LBS

## 2022-07-14 DIAGNOSIS — Z01.419 ENCOUNTER FOR GYNECOLOGICAL EXAMINATION WITHOUT ABNORMAL FINDING: Primary | ICD-10-CM

## 2022-07-14 DIAGNOSIS — Z23 NEED FOR HPV VACCINE: ICD-10-CM

## 2022-07-14 DIAGNOSIS — Z80.41 FAMILY HX OF OVARIAN MALIGNANCY: ICD-10-CM

## 2022-07-14 DIAGNOSIS — Z30.41 ENCOUNTER FOR SURVEILLANCE OF CONTRACEPTIVE PILLS: ICD-10-CM

## 2022-07-14 PROCEDURE — 90651 9VHPV VACCINE 2/3 DOSE IM: CPT

## 2022-07-14 PROCEDURE — 99395 PREV VISIT EST AGE 18-39: CPT | Performed by: OBSTETRICS & GYNECOLOGY

## 2022-07-14 PROCEDURE — 90471 IMMUNIZATION ADMIN: CPT

## 2022-07-14 PROCEDURE — 0503F POSTPARTUM CARE VISIT: CPT | Performed by: OBSTETRICS & GYNECOLOGY

## 2022-07-14 RX ORDER — NORETHINDRONE ACETATE AND ETHINYL ESTRADIOL, AND FERROUS FUMARATE 1MG-20(24)
KIT ORAL
Qty: 84 CAPSULE | Refills: 3 | Status: SHIPPED | OUTPATIENT
Start: 2022-07-14

## 2022-07-14 NOTE — PROGRESS NOTES
Diagnoses and all orders for this visit:    Encounter for gynecological examination without abnormal finding    Encounter for surveillance of contraceptive pills  -     Norethin Ace-Eth Estrad-FE (Taytulla) 1-20 MG-MCG(24) CAPS; take 1 tablet by mouth once daily    Family hx of ovarian malignancy  -     Ambulatory Referral to Oncology Genetics; Future    Need for HPV vaccine  -     HPV VACCINE 9 VALENT IM        Gardisil: Not completed only 1 dose so far , dose 2 given today       Pleasant 24 y o ,  premenopausal female here for annual exam    Patient's last menstrual period was 2022 (exact date)  She denies any issues with bleeding or her menses  Reports regular cycles with Heavy to light flow on OCP's   Denies vaginal issues  Denies pelvic pain  Would like to continue her BCM  Risks, benefits and side effects were discussed  Reviewed ACHES   Medical and family Hx reviewed for VTE risk   Sexually active without any concerns  Gardasil series discussed  No personal of breast, cervical, ovarian or colon CA  Maternal aunt with Ovarian cancer  Pts mother had full hysterectomy d/t increased concern for cancer     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[place], exercise science, 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    Past Medical History:   Diagnosis Date    Migraine headache      Past Surgical History:   Procedure Laterality Date    EYE SURGERY      FASCIOTOMY Bilateral     legs       Immunization History   Administered Date(s) Administered    HPV9 2017, 2022    Influenza Injectable, MDCK, Preservative Free, Quadrivalent, 0 5 mL 10/22/2020    Tdap 2017       Family History   Problem Relation Age of Onset    No Known Problems Mother     Asthma Father     Allergic rhinitis Father     No Known Problems Brother     Heart attack Maternal Grandmother      Social History     Tobacco Use    Smoking status: Never Smoker    Smokeless tobacco: Never Used   Substance Use Topics    Alcohol use:  Yes  Drug use: No       Current Outpatient Medications:     Norethin Ace-Eth Estrad-FE (Taytulla) 1-20 MG-MCG(24) CAPS, take 1 tablet by mouth once daily, Disp: 84 capsule, Rfl: 3    escitalopram (LEXAPRO) 10 mg tablet, Take 1 tablet (10 mg total) by mouth daily, Disp: 90 tablet, Rfl: 1  Patient Active Problem List    Diagnosis Date Noted    Anxiety 2022       No Known Allergies    OB History    Para Term  AB Living   0 0 0 0 0 0   SAB IAB Ectopic Multiple Live Births   0 0 0 0 0       Vitals:    22 1106   BP: 120/70   BP Location: Right arm   Patient Position: Sitting   Cuff Size: Standard   Weight: 59 5 kg (131 lb 3 2 oz)   Height: 5' 4" (1 626 m)     Body mass index is 22 52 kg/m²  Review of Systems   Constitutional: Negative for chills, fatigue, fever, headaches, visual disturbances, and unexpected weight change  Respiratory: Negative for cough, & shortness of breath  Cardiovascular: Negative for chest pain       Gastrointestinal: Negative for Abd pain, nausea & vomiting, constipation and diarrhea  Genitourinary: Negative for difficulty urinating, dysuria, hematuria, dyspareunia, unusual vaginal bleeding or discharge  Skin: Negative skin changes    Physical Exam   Constitutional: Alert & Oriented x3, well-developed and well-nourished  No distress  HENT: Atraumatic, Normocephalic, Conjunctivae clear  Neck: Normal range of motion  Neck supple  No thyromegaly, mass, nodules or tenderness  Pulmonary: Effort normal  Lungs clear to ascultation bilateral  Cardiac: RRR, no murmur   Abdominal: Soft  No tenderness or masses  Musculoskeletal: Normal ROM  Skin: Warm & Dry  Psychological: Normal mood, thought content, behavior & judgement     Declines breast and pelvic exam today, would like to wait until next year  She is not concerned about STD's     SBE encouraged, ASCCP guidelines reviewed  Safe sex practice reviewed, Condoms encouraged with all sexual activity to prevent STI's  Gardisil vaccines recommended up to age 39  Calcium/ Vit D dietary requirements discussed,   Weight bearing exercises minium of 150 mins/weekly advised  Advised to call with any issues,  all concerns & questions addressed     Perineal hygiene reviewed   F/U Annually and PRN

## 2022-07-14 NOTE — PROGRESS NOTES
LMP:   PMB:  SA: YES   HPV: Had 1 dose in 2017  Birth control: Pill doing good on the pills   Last pap: Not on file  Last mammo: Not on file:  Last colonoscopy: Not on file  Last Dexa: Not on file  Family History:   Maternal Aunt - ovarian cancer

## 2022-07-19 ENCOUNTER — TELEPHONE (OUTPATIENT)
Dept: GENETICS | Facility: CLINIC | Age: 22
End: 2022-07-19

## 2022-07-19 NOTE — TELEPHONE ENCOUNTER
I called Geraldine to schedule a new patient appointment with the [facility]       Outcome:   I left a voice message encouraging the patient to call the genetics team at [phone] to schedule this appointment  Follow-up:   At this time the referral will be closed and we will wait to hear back from the patient regarding scheduling this appointment

## 2023-02-16 NOTE — PROGRESS NOTES
Tabatha Stockton [age] female MRN: [id number]      ASSESSMENT/PLAN  Problem List Items Addressed This Visit    None  Visit Diagnoses     Dizziness    -  Primary    Relevant Orders    Iron Panel (Includes Ferritin, Iron Sat%, Iron, and TIBC)    Palpitations        Pruritus        Easy bruising        Elevated C-reactive protein (CRP)        Relevant Orders    RF Screen w/ Reflex to Titer    Antinuclear Antibodies (ELPIDIO), IFA    Lyme Antibody Profile with reflex to WB    Elevated serum creatinine        Relevant Orders    Comprehensive metabolic panel    Low ferritin        Healthcare maintenance            Unclear etiology of symptoms  Recheck kidney function -- if persistently abnormal, consider US  Check Lyme, RF, ELPIDIO in setting of elevated CRP/joint pain (though this could be due to pt's increased physical activity)   Check iron panel given low ferritin (iron level not done with labs)   Holter placed to evaluate for cardiac etiology of dizziness and palpitations     Health Maintenance:   BP WNL   STD screening deferred per pt request   Pap DUE -- scheduled for 07/2023   Vaccinations: TDap UTD, Flu UTD, COVID completed primary + booster   Encouraged regular physical activity, varied diet, and regular dental/eye exams       Future Appointments   Date Time Provider Vicky Proctor   2/22/2023 10:00 AM [address] [place] [address] [facility]   7/20/2023 11:00 AM JOSÉ MIGUEL Cochran [facility]          SUBJECTIVE  CC: Bleeding/Bruising, Fatigue, and Dizziness      HPI:  Tabatha Stockton is a 25 y o  female who presents due to multiple concerns as detailed below and annual physical     Has been having daily dizzy episodes since prior to Giuliana  Describes it as both dizziness and lightheadedness  Usually tries to nap and it is sometimes gone when she wakes up  Otherwise can last for a few hours and will resolve but then comes back     Otherwise has tried eating but it doesn't make a difference   Associated with palpitations  No shortness of breath, vision changes, nausea, shaky, diaphoresis     At night, has sensation of heart fluttering -- started in January   Short episodes   No associated chest palpitations, shortness of breath associated     For the last 2 weeks has been itchy without rash and having bruising -- only on R side     Of note, pt had recent labs at Urgent Care  Elevated kidney function (Cr 1 12/GFR 70) -- FHx of UPJ obstruction   Benign CBC but Ferritin 17   CRP 1 4   Normal TSH, B12      Review of Systems   Constitutional: Negative for diaphoresis and unexpected weight change  HENT: Negative for congestion, ear pain, nosebleeds, rhinorrhea and sore throat  Eyes: Negative for visual disturbance (when standing up intermittently )  Respiratory: Negative for cough and shortness of breath  Cardiovascular: Positive for palpitations  Negative for chest pain and leg swelling  Gastrointestinal: Negative for abdominal pain, blood in stool, constipation, diarrhea and nausea  Endocrine: Negative for polyuria  Genitourinary: Negative for dysuria, hematuria and menstrual problem  Musculoskeletal: Positive for joint swelling (knees/ankles -- not redness/swelling, unsure if due to increased physical activity lately)  Skin: Negative for rash  (+) itching    Neurological: Positive for dizziness, light-headedness and headaches (chronic, unchanged)  Negative for tremors  Hematological: Bruises/bleeds easily  Psychiatric/Behavioral: Negative for sleep disturbance         Historical Information   The patient history was reviewed and updated as follows:    Past Medical History:   Diagnosis Date   • Migraine headache      Past Surgical History:   Procedure Laterality Date   • EYE SURGERY     • FASCIOTOMY Bilateral     legs     Family History   Problem Relation Age of Onset   • No Known Problems Mother    • Asthma Father    • Allergic rhinitis Father    • No Known Problems Brother    • Heart attack Maternal Grandmother       Social History   Social History     Substance and Sexual Activity   Alcohol Use Yes     Social History     Substance and Sexual Activity   Drug Use No     Social History     Tobacco Use   Smoking Status Never   Smokeless Tobacco Never       Medications:     Current Outpatient Medications:   •  escitalopram (LEXAPRO) 10 mg tablet, Take 1 tablet (10 mg total) by mouth daily, Disp: 90 tablet, Rfl: 1  •  Norethin Ace-Eth Estrad-FE (Taytulla) 1-20 MG-MCG(24) CAPS, take 1 tablet by mouth once daily, Disp: 84 capsule, Rfl: 3  No Known Allergies    OBJECTIVE    Vitals:   Vitals:    02/17/23 1418   BP: 110/74   BP Location: Left arm   Patient Position: Sitting   Cuff Size: Standard   Pulse: 70   Temp: 98 4 °F (36 9 °C)   SpO2: 100%   Weight: 59 4 kg (131 lb)           Physical Exam  Vitals and nursing note reviewed  Constitutional:       General: She is not in acute distress  Appearance: Normal appearance  HENT:      Head: Normocephalic and atraumatic  Right Ear: Tympanic membrane, ear canal and external ear normal       Left Ear: Tympanic membrane, ear canal and external ear normal       Nose: Nose normal       Mouth/Throat:      Mouth: Mucous membranes are moist       Pharynx: No oropharyngeal exudate or posterior oropharyngeal erythema  Eyes:      Conjunctiva/sclera: Conjunctivae normal    Cardiovascular:      Rate and Rhythm: Normal rate and regular rhythm  Pulmonary:      Effort: Pulmonary effort is normal  No respiratory distress  Breath sounds: Normal breath sounds  Abdominal:      General: Bowel sounds are normal  There is no distension  Palpations: Abdomen is soft  Tenderness: There is no abdominal tenderness  Musculoskeletal:      Right lower leg: No edema  Left lower leg: No edema  Lymphadenopathy:      Cervical: No cervical adenopathy  Skin:     General: Skin is warm and dry     Neurological: General: No focal deficit present  Mental Status: She is alert     Psychiatric:         Mood and Affect: Mood normal                     DO [facility] [address] [facility]   2/17/2023  2:46 PM

## 2023-02-17 ENCOUNTER — OFFICE VISIT (OUTPATIENT)
Dept: FAMILY MEDICINE CLINIC | Facility: CLINIC | Age: 23
End: 2023-02-17

## 2023-02-17 VITALS
BODY MASS INDEX: 22.49 KG/M2 | TEMPERATURE: 98.4 F | OXYGEN SATURATION: 100 % | HEART RATE: 70 BPM | WEIGHT: 131 LBS | DIASTOLIC BLOOD PRESSURE: 74 MMHG | SYSTOLIC BLOOD PRESSURE: 110 MMHG

## 2023-02-17 DIAGNOSIS — R42 DIZZINESS: Primary | ICD-10-CM

## 2023-02-17 DIAGNOSIS — R79.82 ELEVATED C-REACTIVE PROTEIN (CRP): ICD-10-CM

## 2023-02-17 DIAGNOSIS — R23.3 EASY BRUISING: ICD-10-CM

## 2023-02-17 DIAGNOSIS — R00.2 PALPITATIONS: ICD-10-CM

## 2023-02-17 DIAGNOSIS — Z00.00 HEALTHCARE MAINTENANCE: ICD-10-CM

## 2023-02-17 DIAGNOSIS — L29.9 PRURITUS: ICD-10-CM

## 2023-02-17 DIAGNOSIS — R79.89 ELEVATED SERUM CREATININE: ICD-10-CM

## 2023-02-17 DIAGNOSIS — R79.0 LOW FERRITIN: ICD-10-CM

## 2023-02-18 ENCOUNTER — APPOINTMENT (OUTPATIENT)
Dept: LAB | Facility: MEDICAL CENTER | Age: 23
End: 2023-02-18

## 2023-02-18 DIAGNOSIS — R42 DIZZINESS: ICD-10-CM

## 2023-02-18 DIAGNOSIS — R79.89 ELEVATED SERUM CREATININE: ICD-10-CM

## 2023-02-18 DIAGNOSIS — R79.82 ELEVATED C-REACTIVE PROTEIN (CRP): ICD-10-CM

## 2023-02-18 LAB
ALBUMIN SERPL BCP-MCNC: 3.8 G/DL (ref 3.5–5)
ALP SERPL-CCNC: 67 U/L (ref 46–116)
ALT SERPL W P-5'-P-CCNC: 37 U/L (ref 12–78)
ANION GAP SERPL CALCULATED.3IONS-SCNC: 6 MMOL/L (ref 4–13)
AST SERPL W P-5'-P-CCNC: 24 U/L (ref 5–45)
B BURGDOR IGG+IGM SER-ACNC: 0.2 AI
BILIRUB SERPL-MCNC: 0.24 MG/DL (ref 0.2–1)
BUN SERPL-MCNC: 10 MG/DL (ref 5–25)
CALCIUM SERPL-MCNC: 9.5 MG/DL (ref 8.3–10.1)
CHLORIDE SERPL-SCNC: 108 MMOL/L (ref 96–108)
CO2 SERPL-SCNC: 26 MMOL/L (ref 21–32)
CREAT SERPL-MCNC: 0.91 MG/DL (ref 0.6–1.3)
FERRITIN SERPL-MCNC: 19 NG/ML (ref 8–388)
GFR SERPL CREATININE-BSD FRML MDRD: 89 ML/MIN/1.73SQ M
GLUCOSE P FAST SERPL-MCNC: 70 MG/DL (ref 65–99)
IRON SATN MFR SERPL: 17 % (ref 15–50)
IRON SERPL-MCNC: 66 UG/DL (ref 50–170)
POTASSIUM SERPL-SCNC: 3.9 MMOL/L (ref 3.5–5.3)
PROT SERPL-MCNC: 7.8 G/DL (ref 6.4–8.4)
SODIUM SERPL-SCNC: 140 MMOL/L (ref 135–147)
TIBC SERPL-MCNC: 389 UG/DL (ref 250–450)

## 2023-02-20 ENCOUNTER — TELEPHONE (OUTPATIENT)
Dept: OBGYN CLINIC | Facility: HOSPITAL | Age: 23
End: 2023-02-20

## 2023-02-20 LAB
ANA SPECKLED TITR SER: ABNORMAL {TITER}
ANA TITR SER IF: POSITIVE {TITER}
RHEUMATOID FACT SER QL LA: NEGATIVE
SL AMB NOTE:: ABNORMAL

## 2023-02-21 NOTE — TELEPHONE ENCOUNTER
I lvm to schedule from ortho care request    Where Does it Hurt? Are you considering joint replacement? No   Are you seeking a second opinion? Yes  If yes, who is your doctor?  Arpit Smart

## 2023-02-22 ENCOUNTER — CLINICAL SUPPORT (OUTPATIENT)
Dept: FAMILY MEDICINE CLINIC | Facility: CLINIC | Age: 23
End: 2023-02-22

## 2023-02-22 DIAGNOSIS — R00.2 PALPITATIONS: ICD-10-CM

## 2023-02-23 ENCOUNTER — TELEPHONE (OUTPATIENT)
Dept: FAMILY MEDICINE CLINIC | Facility: CLINIC | Age: 23
End: 2023-02-23

## 2023-02-24 DIAGNOSIS — R00.2 PALPITATIONS: Primary | ICD-10-CM

## 2023-02-24 NOTE — TELEPHONE ENCOUNTER
Please let pt know her holter monitor shows no significant abnormal heart rhythms  She does have an elevated heart rate about 18% of the time and a number of the events she recorded where when her heart rate was high  We could consider having her see Cardiology to discuss further if she is interested

## 2023-02-24 NOTE — TELEPHONE ENCOUNTER
Notified  Interested in cardiology   Would like a general cardiology referral as she see someone outside of network

## 2023-05-19 ENCOUNTER — RA CDI HCC (OUTPATIENT)
Dept: OTHER | Facility: HOSPITAL | Age: 23
End: 2023-05-19

## 2023-05-19 NOTE — PROGRESS NOTES
[facility]  coding opportunities       Chart reviewed, no opportunity found: CHART REVIEWED, NO OPPORTUNITY FOUND        Patients Insurance        Commercial Insurance: John Jennings

## 2023-05-25 ENCOUNTER — CONSULT (OUTPATIENT)
Dept: CARDIOLOGY CLINIC | Facility: CLINIC | Age: 23
End: 2023-05-25

## 2023-05-25 VITALS
SYSTOLIC BLOOD PRESSURE: 105 MMHG | HEIGHT: 64 IN | DIASTOLIC BLOOD PRESSURE: 60 MMHG | HEART RATE: 80 BPM | WEIGHT: 130 LBS | BODY MASS INDEX: 22.2 KG/M2 | OXYGEN SATURATION: 96 %

## 2023-05-25 DIAGNOSIS — R00.2 PALPITATIONS: ICD-10-CM

## 2023-05-25 DIAGNOSIS — R42 LIGHTHEADEDNESS: Primary | ICD-10-CM

## 2023-05-25 NOTE — PROGRESS NOTES
[facility]    CHIEF COMPLAINT:   Chief Complaint   Patient presents with   • Palpitations   • Dizziness   • Fatigue       HPI:  Blanca Contreras is a 25 y o  female without any significant past medical history who presents today in consultation for palpitations and lightheadedness  She reports symptoms of both dizziness and lightheadedness which started around November 2022  There were no new medications or illnesses at that time that she recalls  She reports lightheadedness/presyncope when she stands up quickly  She reports palpitations which are often at rest but can occasionally be associated with positional changes  She noticed her heart rates were fast when just laying in bed at night  More recently, her fast heart rates have been throughout the day time hours as well  She also reports presyncope when she exerts herself, which has led to her being less active lately  She was sent for lab work by her PCP and also worse a 5-day Biotel with results as noted below  She just graduated from [employer] with her BS in [employer]  She is staying there to work on her Master in exercise training  She reports normal eating habits - 3 meals per day with snacks in between  She has a 32 oz water bottle and drinks about 2 per day  She has a history of anxiety and has been on Lexapro for the past couple of years  She denies any fever, chills, fatigue, visual changes, chest pain, shortness of breath at rest or with exertion, orthopnea, PND, nausea, vomiting, diarrhea, lower extremity swelling  +fatigue, +bruising over legs and arms  +Syncope - one day out in the sun all day and she felt lightheaded, went inside and blacked out for 30 seconds and came to  She felt she may have been dehydrated  Another time, she was getting ready for class but does not recall full details  No recurrence since the second episode which happened in March this year       2nd time she was inside and she as getting ready for class  +palpitations which can be associated with and without lightheadedness   Lays down rates are elevated and then throughout the day when she stands up   Associated with shortness of breath    Periods have been relatively normal but missed 2 months since January  No AUB or heavy bleeding  Social history: Non smoker  Wine on weekends - unable to quantify  No illict drugs  Family history: Maternal GM passes away at 36 from MI  Paternal GF had MI, stent  Mother has high blood pressure  Father without heart problems  The following portions of the patient's history were reviewed and updated as appropriate: allergies, current medications, past family history, past medical history, past social history, past surgical history, and problem list     SINCE LAST OV I REVIEWED WITH THE PATIENT THE INTERIM LABS, TEST RESULTS, CONSULTANT(S) NOTES AND PERFORMED AN INTERIM REVIEW OF HISTORY    Past Medical History:   Diagnosis Date   • Migraine headache        Past Surgical History:   Procedure Laterality Date   • EYE SURGERY     • FASCIOTOMY Bilateral     legs       Social History     Socioeconomic History   • Marital status: Single     Spouse name: Not on file   • Number of children: Not on file   • Years of education: Not on file   • Highest education level: Not on file   Occupational History   • Occupation: Student   Tobacco Use   • Smoking status: Never   • Smokeless tobacco: Never   Substance and Sexual Activity   • Alcohol use:  Yes   • Drug use: No   • Sexual activity: Yes     Partners: Male     Birth control/protection: Pill   Other Topics Concern   • Not on file   Social History Narrative    Lives with Dad     [address] [employer]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     Social Determinants of Health     Financial Resource Strain: Not on file   Food Insecurity: Not on file   Transportation Needs: Not on file   Physical Activity: Not on file   Stress: Not on file   Social Connections: Not "on file   Intimate Partner Violence: Not on file   Housing Stability: Not on file       Family History   Problem Relation Age of Onset   • No Known Problems Mother    • Asthma Father    • Allergic rhinitis Father    • No Known Problems Brother    • Heart attack Maternal Grandmother        No Known Allergies    Current Outpatient Medications   Medication Sig Dispense Refill   • escitalopram (LEXAPRO) 10 mg tablet Take 1 tablet (10 mg total) by mouth daily 90 tablet 1   • Norethin Ace-Eth Estrad-FE (Taytulla) 1-20 MG-MCG(24) CAPS take 1 tablet by mouth once daily 84 capsule 3     No current facility-administered medications for this visit  /60 (BP Location: Left arm, Patient Position: Sitting, Cuff Size: Standard)   Pulse 80   Ht 5' 4\" (1 626 m)   Wt 59 kg (130 lb)   SpO2 96%   BMI 22 31 kg/m²     Review of Systems   All other systems reviewed and are negative  Physical Exam  Vitals reviewed  Constitutional:       General: She is not in acute distress  Appearance: Normal appearance  She is well-developed and normal weight  She is not toxic-appearing  HENT:      Head: Normocephalic and atraumatic  Eyes:      General: No scleral icterus  Extraocular Movements: Extraocular movements intact  Conjunctiva/sclera: Conjunctivae normal    Neck:      Vascular: No carotid bruit  Cardiovascular:      Rate and Rhythm: Normal rate and regular rhythm  Pulses: Normal pulses  Heart sounds: Normal heart sounds  No murmur heard  No gallop  Pulmonary:      Effort: Pulmonary effort is normal  No respiratory distress  Breath sounds: Normal breath sounds  Abdominal:      General: Abdomen is flat  Bowel sounds are normal  There is no distension  Palpations: Abdomen is soft  Tenderness: There is no abdominal tenderness  Musculoskeletal:         General: No swelling  Cervical back: Neck supple  Right lower leg: No edema     Skin:     General: Skin is warm " and dry  Capillary Refill: Capillary refill takes less than 2 seconds  Coloration: Skin is not jaundiced or pale  Neurological:      Mental Status: She is alert  Psychiatric:         Mood and Affect: Mood normal          Behavior: Behavior normal           Lab Results   Component Value Date    ALT 37 02/18/2023    AST 24 02/18/2023    BUN 10 02/18/2023    CALCIUM 9 5 02/18/2023     02/18/2023    CO2 26 02/18/2023    CREATININE 0 91 02/18/2023    K 3 9 02/18/2023         Lab Results   Component Value Date    HCT 42 4 01/26/2021    HGB 13 9 01/26/2021     01/26/2021    WBC 6 43 01/26/2021       Cardiac studies:   Results for orders placed or performed in visit on 05/25/23   POCT ECG    Impression    Sinus rhythm with short DC  Otherwise normal     5-day Biotel from 2/17/2023 - 2/22/2023: Predominantly normal sinus rhythm  The average heart rate was 85 bpm, minimum heart rate 61 bpm and a maximum heart rate of 162 bpm   No atrial fibrillation or atrial flutter  There was no supraventricular ectopic activity  No supraventricular arrhythmias  There were no significant pauses and there is no evidence of AV block  PVC burden was less than 0 01%  There was a single ventricular triplet  There were 14 recorded events which correlated to sinus rhythm and sinus tachycardia  The fastest recorded heart rate of 162 bpm was sinus tachycardia  There was normal variation of heart rates between day and nighttime hours  ASSESSMENT AND PLAN:  Mattie Cordero was seen today for palpitations, dizziness and fatigue  Diagnoses and all orders for this visit:    Lightheadedness  -     Stress test only, exercise; Future  -     Holter monitor; Future  -     POCT ECG    Palpitations  -     Ambulatory Referral to Cardiology  -     Stress test only, exercise; Future  -     Holter monitor; Future  -     POCT ECG    70-year-old healthy female who presents today for palpitations and lightheadedness    She reports symptoms that have been ongoing since November 2022  Over time she feels that her symptoms are now occurring on a daily basis  Her previous 5-day Biotel monitor was reviewed and demonstrated predominantly normal sinus rhythm  The average heart rate was 85 bpm, minimum heart rate 61 bpm and a maximum heart rate of 162 bpm   No atrial fibrillation or atrial flutter  There was no supraventricular ectopic activity  No supraventricular arrhythmias  There were no significant pauses and there is no evidence of AV block  PVC burden was less than 0 01%  There was a single ventricular triplet  There were 14 recorded events which correlated to sinus rhythm and sinus tachycardia  The fastest recorded heart rate of 162 bpm was sinus tachycardia  There was normal variation of heart rates between day and nighttime hours  In summary, her monitor showed a normal average heart rate of 85 bpm   There were periods of sinus rhythm and sinus tachycardia for which she had triggered the monitor  Reports no exercise during the monitoring period  She had normal variation in heart rates between night and daytime hours  There were no supraventricular arrhythmias  Lab work is pertinent for normal thyroid function and no anemia  I performed orthostatic vital signs in the office today and there was no appreciable change in blood pressure or her heart rate when changing from lying, to supine, to standing  ECG today shows sinus rhythm with short RI  No preexcitation, Brugada, normal QTc  Her cardiovascular exam today is normal     Low suspicion for dysautonomia such as IST or POTS  Given her exertional symptoms and presyncope with lightheadedness we will check an exercise stress test and repeat a 24-hour Holter monitor to assess for any exercise-induced arrhythmias and for average heart rate, respectively  We will defer tilt table testing at this time   Continue to maintain adequate hydration, good sleep hygiene, moderate intensity physical activity      Kathi Baum MD

## 2023-05-25 NOTE — PATIENT INSTRUCTIONS
You were seen today in the Cardiology office for lightheadedness and palpitations  Please have a Holter monitor and exercise stress test performed and we will contact you with the results  Thank you for choosing [facility]

## 2023-05-30 ENCOUNTER — OFFICE VISIT (OUTPATIENT)
Dept: FAMILY MEDICINE CLINIC | Facility: CLINIC | Age: 23
End: 2023-05-30

## 2023-05-30 VITALS
BODY MASS INDEX: 22.53 KG/M2 | DIASTOLIC BLOOD PRESSURE: 78 MMHG | HEART RATE: 83 BPM | HEIGHT: 64 IN | OXYGEN SATURATION: 100 % | WEIGHT: 132 LBS | SYSTOLIC BLOOD PRESSURE: 114 MMHG | TEMPERATURE: 98 F

## 2023-05-30 DIAGNOSIS — F41.9 ANXIETY: Primary | ICD-10-CM

## 2023-05-30 DIAGNOSIS — R00.2 PALPITATIONS: ICD-10-CM

## 2023-05-30 DIAGNOSIS — R42 LIGHTHEADEDNESS: ICD-10-CM

## 2023-05-30 NOTE — PROGRESS NOTES
"Maria Alejandra Gandhi 2000 female MRN: [id number]      ASSESSMENT/PLAN  Problem List Items Addressed This Visit        Other    Anxiety - Primary    Lightheadedness    Palpitations     Doing well with Lexapro, continue     F/u with Cardio after completion of studies      Future Appointments   Date Time Provider Vicky Esthela   6/14/2023  1:45 PM EA HOLTER/EKG 1 EA [address] [facility]   6/26/2023 11:00 AM EA STRESS 1 EA CAR NI [address] [facility]   7/10/2023  2:40 PM MD DEB [facility]   2/23/2024  1:00 PM DO ARIK Fagan  [facility]          SUBJECTIVE  CC: Follow-up      HPI:  Maria Alejandra Gandhi is a 25 y o  female who presents for chronic follow up  Anxiety is \"really good\" -- has been more consistent with taking Lexapro, tolerating without issue (does have some fatigue, though not enough that she would want to stop it and unsure if related to her other symptoms)     Also established with Cardiology -- scheduled for 1 day holter and stress test  Continues to have palpitations and lightheadedness unchanged from previous  Did recently wake up with broken blood vessels on her face (did not have any vomiting or heavy lifting prior)  Review of Systems   Constitutional: Positive for fatigue  Cardiovascular: Positive for palpitations  Neurological: Positive for light-headedness  Psychiatric/Behavioral: The patient is nervous/anxious (improved)          Historical Information   The patient history was reviewed and updated as follows:    Past Medical History:   Diagnosis Date   • Migraine headache      Past Surgical History:   Procedure Laterality Date   • EYE SURGERY     • FASCIOTOMY Bilateral     legs     Family History   Problem Relation Age of Onset   • No Known Problems Mother    • Asthma Father    • Allergic rhinitis Father    • No Known Problems Brother    • Heart attack Maternal Grandmother       Social History   Social History     Substance and Sexual Activity   Alcohol Use " "Yes     Social History     Substance and Sexual Activity   Drug Use No     Social History     Tobacco Use   Smoking Status Never   Smokeless Tobacco Never       Medications:     Current Outpatient Medications:   •  escitalopram (LEXAPRO) 10 mg tablet, Take 1 tablet (10 mg total) by mouth daily, Disp: 90 tablet, Rfl: 1  •  Norethin Ace-Eth Estrad-FE (Taytulla) 1-20 MG-MCG(24) CAPS, take 1 tablet by mouth once daily, Disp: 84 capsule, Rfl: 3  No Known Allergies    OBJECTIVE    Vitals:   Vitals:    05/30/23 1426   BP: 114/78   BP Location: Left arm   Patient Position: Sitting   Cuff Size: Adult   Pulse: 83   Temp: 98 °F (36 7 °C)   SpO2: 100%   Weight: 59 9 kg (132 lb)   Height: 5' 4\" (1 626 m)           Physical Exam  Vitals and nursing note reviewed  Constitutional:       General: She is not in acute distress  Appearance: Normal appearance  HENT:      Head: Normocephalic and atraumatic  Pulmonary:      Effort: Pulmonary effort is normal  No respiratory distress  Neurological:      General: No focal deficit present  Mental Status: She is alert     Psychiatric:         Mood and Affect: Mood normal                     [place] DO Vitaly Sanford  [address] [facility]   5/30/2023  2:46 PM    "

## 2023-06-14 ENCOUNTER — HOSPITAL ENCOUNTER (OUTPATIENT)
Dept: NON INVASIVE DIAGNOSTICS | Facility: HOSPITAL | Age: 23
Discharge: HOME | End: 2023-06-14
Attending: INTERNAL MEDICINE
Payer: COMMERCIAL

## 2023-06-14 DIAGNOSIS — R42 LIGHTHEADEDNESS: ICD-10-CM

## 2023-06-14 DIAGNOSIS — R00.2 PALPITATIONS: ICD-10-CM

## 2023-06-14 PROCEDURE — 93226 XTRNL ECG REC<48 HR SCAN A/R: CPT

## 2023-06-14 PROCEDURE — 93225 XTRNL ECG REC<48 HRS REC: CPT

## 2023-06-26 ENCOUNTER — HOSPITAL ENCOUNTER (OUTPATIENT)
Dept: NON INVASIVE DIAGNOSTICS | Facility: HOSPITAL | Age: 23
Discharge: HOME | End: 2023-06-26
Attending: INTERNAL MEDICINE

## 2023-06-29 ENCOUNTER — HOSPITAL ENCOUNTER (OUTPATIENT)
Dept: NON INVASIVE DIAGNOSTICS | Facility: HOSPITAL | Age: 23
Discharge: HOME | End: 2023-06-29
Attending: INTERNAL MEDICINE
Payer: COMMERCIAL

## 2023-06-29 VITALS — BODY MASS INDEX: 22.53 KG/M2 | WEIGHT: 132 LBS | HEIGHT: 64 IN

## 2023-06-29 DIAGNOSIS — R00.2 PALPITATIONS: ICD-10-CM

## 2023-06-29 DIAGNOSIS — R42 LIGHTHEADEDNESS: ICD-10-CM

## 2023-06-29 LAB
ARRHY DURING EX: NORMAL
CHEST PAIN STATEMENT: NORMAL
MAX DIASTOLIC BP: 74 MMHG
MAX HEART RATE: 184 BPM
MAX HR PERCENT: 92 %
MAX HR: 184 BPM
MAX PREDICTED HEART RATE: 198 BPM
MAX. SYSTOLIC BP: 174 MMHG
PROTOCOL NAME: NORMAL
RATE PRESSURE PRODUCT: NORMAL
REASON FOR TERMINATION: NORMAL
SL CV STRESS RECOVERY BP: NORMAL MMHG
SL CV STRESS RECOVERY HR: 112 BPM
SL CV STRESS RECOVERY O2 SAT: 98 %
SL CV STRESS STAGE REACHED: 5
STRESS ANGINA INDEX: 0
STRESS BASELINE BP: NORMAL MMHG
STRESS BASELINE HR: 87 BPM
STRESS DUKE TREADMILL SCORE: 13
STRESS O2 SAT REST: 100 %
STRESS PEAK HR: 184 BPM
STRESS POST ESTIMATED WORKLOAD: 15.3 METS
STRESS POST EXERCISE DUR MIN: 13 MIN
STRESS POST O2 SAT PEAK: 94 %
STRESS POST PEAK BP: 170 MMHG
STRESS ST DEPRESSION: 0 MM
TARGET HR FORMULA: NORMAL
TEST INDICATION: NORMAL
TIME IN EXERCISE PHASE: NORMAL

## 2023-06-29 PROCEDURE — 93016 CV STRESS TEST SUPVJ ONLY: CPT | Performed by: INTERNAL MEDICINE

## 2023-06-29 PROCEDURE — 93018 CV STRESS TEST I&R ONLY: CPT | Performed by: INTERNAL MEDICINE

## 2023-06-29 PROCEDURE — 93017 CV STRESS TEST TRACING ONLY: CPT

## 2023-07-05 ENCOUNTER — OFFICE VISIT (OUTPATIENT)
Dept: CARDIOLOGY CLINIC | Facility: CLINIC | Age: 23
End: 2023-07-05
Payer: COMMERCIAL

## 2023-07-05 VITALS
SYSTOLIC BLOOD PRESSURE: 116 MMHG | HEIGHT: 64 IN | OXYGEN SATURATION: 99 % | DIASTOLIC BLOOD PRESSURE: 74 MMHG | BODY MASS INDEX: 22.53 KG/M2 | WEIGHT: 132 LBS | HEART RATE: 76 BPM

## 2023-07-05 DIAGNOSIS — R42 LIGHTHEADEDNESS: ICD-10-CM

## 2023-07-05 DIAGNOSIS — R00.2 PALPITATIONS: Primary | ICD-10-CM

## 2023-07-05 PROCEDURE — 99212 OFFICE O/P EST SF 10 MIN: CPT | Performed by: INTERNAL MEDICINE

## 2023-07-05 NOTE — PATIENT INSTRUCTIONS
You were seen today in the Cardiology office for follow up. We reviewed the results of your exercise stress test and Holter monitor both which were normal.    Thank you for choosing [address] [place] [address].

## 2023-07-05 NOTE — PROGRESS NOTES
[facility]    CHIEF COMPLAINT:   No chief complaint on file. HPI:  Ivana Harper is a 25 y.o. female without any significant past medical history who presents today for follow up for lightheadedness and palpitations. Initial OV - 5/25/23:  She reports symptoms of both dizziness and lightheadedness which started around November 2022. There were no new medications or illnesses at that time that she recalls. She reports lightheadedness/presyncope when she stands up quickly. She reports palpitations which are often at rest but can occasionally be associated with positional changes. She noticed her heart rates were fast when just laying in bed at night. More recently, her fast heart rates have been throughout the day time hours as well. She also reports presyncope when she exerts herself, which has led to her being less active lately. She was sent for lab work by her PCP and also worse a 5-day Biotel with results as noted below. She just graduated from [employer] with her BS in [employer]. She is staying there to work on her Master in exercise training. She reports normal eating habits - 3 meals per day with snacks in between. She has a 32 oz water bottle and drinks about 2 per day. She has a history of anxiety and has been on Lexapro for the past couple of years. +Syncope - one day out in the sun all day and she felt lightheaded, went inside and blacked out for 30 seconds and came to. She felt she may have been dehydrated. Another time, she was getting ready for class but does not recall full details. No recurrence since the second episode which happened in March this year. 2nd time occurred while she was inside and she as getting ready for class. +palpitations which can be associated with/without lightheadedness     Social history: Non smoker. Wine on weekends - unable to quantify. No illict drugs.    Family history: Maternal GM passed away at 36 from MI. Paternal GF had MI, stent. Mother has high blood pressure. Father without heart problems. Interval history: Feels symptoms still occur but are less frequent. Still with some occasional palpitations and lightheadedness. Staying hydrated and getting more sleep. Drinks coffee and sometimes has energy drinks. She has been less active since graduation. She will be starting her Masters program shortly at [employer]. The following portions of the patient's history were reviewed and updated as appropriate: allergies, current medications, past family history, past medical history, past social history, past surgical history, and problem list.    SINCE LAST OV I REVIEWED WITH THE PATIENT THE INTERIM LABS, TEST RESULTS, CONSULTANT(S) NOTES AND PERFORMED AN INTERIM REVIEW OF HISTORY    Past Medical History:   Diagnosis Date   • Migraine headache        Past Surgical History:   Procedure Laterality Date   • EYE SURGERY     • FASCIOTOMY Bilateral     legs       Social History     Socioeconomic History   • Marital status: Single     Spouse name: Not on file   • Number of children: Not on file   • Years of education: Not on file   • Highest education level: Not on file   Occupational History   • Occupation: Student   Tobacco Use   • Smoking status: Never   • Smokeless tobacco: Never   Substance and Sexual Activity   • Alcohol use:  Yes   • Drug use: No   • Sexual activity: Yes     Partners: Male     Birth control/protection: Pill   Other Topics Concern   • Not on file   Social History Narrative    Lives with Dad     [address] in [place]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     Social Determinants of Health     Financial Resource Strain: Not on file   Food Insecurity: Not on file   Transportation Needs: Not on file   Physical Activity: Not on file   Stress: Not on file   Social Connections: Not on file   Intimate Partner Violence: Not on file   Housing Stability: Not on file       Family History   Problem Relation Age of Onset   • No Known Problems Mother    • Asthma Father    • Allergic rhinitis Father    • No Known Problems Brother    • Heart attack Maternal Grandmother        No Known Allergies    Current Outpatient Medications   Medication Sig Dispense Refill   • escitalopram (LEXAPRO) 10 mg tablet Take 1 tablet (10 mg total) by mouth daily 90 tablet 1   • Gemmily 1-20 MG-MCG(24) CAPS take 1 tablet by mouth once daily 28 capsule 1     No current facility-administered medications for this visit. /74 (BP Location: Left arm, Patient Position: Sitting, Cuff Size: Standard)   Pulse 76   Ht 5' 4" (1.626 m)   Wt 59.9 kg (132 lb)   SpO2 99%   BMI 22.66 kg/m²     Review of Systems   All other systems reviewed and are negative. Physical Exam  Vitals reviewed. Constitutional:       General: She is not in acute distress. Appearance: Normal appearance. She is well-developed and normal weight. She is not toxic-appearing. HENT:      Head: Normocephalic and atraumatic. Eyes:      General: No scleral icterus. Cardiovascular:      Rate and Rhythm: Normal rate and regular rhythm. Pulses: Normal pulses. Heart sounds: Normal heart sounds. No murmur heard. No gallop. Pulmonary:      Effort: Pulmonary effort is normal. No respiratory distress. Breath sounds: Normal breath sounds. No wheezing or rales. Abdominal:      General: Abdomen is flat. Bowel sounds are normal. There is no distension. Tenderness: There is no abdominal tenderness. Musculoskeletal:      Right lower leg: No edema. Left lower leg: No edema. Skin:     General: Skin is warm and dry. Capillary Refill: Capillary refill takes less than 2 seconds. Coloration: Skin is not jaundiced or pale. Neurological:      Mental Status: She is alert.    Psychiatric:         Mood and Affect: Mood normal.         Behavior: Behavior normal.          Lab Results   Component Value Date    K 3.9 02/18/2023     02/18/2023    CO2 26 02/18/2023    BUN 10 02/18/2023    CREATININE 0.91 02/18/2023    CALCIUM 9.5 02/18/2023    ALT 37 02/18/2023    AST 24 02/18/2023         Lab Results   Component Value Date    WBC 6.43 01/26/2021    HGB 13.9 01/26/2021    HCT 42.4 01/26/2021     01/26/2021       Cardiac studies:   EST - 6/29/23:   •  Stress ECG: Overall, the patient's exercise capacity was normal for their age. The patient reached stage 5.0 of the protocol after exercising for 13 min and had a maximal HR of 184 bpm (92 % of MPHR) and 15.3 METS. The patient experienced no angina during the test. Blood pressure demonstrated a normal response and heart rate demonstrated a normal response to stress. The patient's heart rate recovery was normal.  •  Stress ECG: No ST deviation is noted. There were no arrhythmias during stress. The stress ECG is negative for ischemia after maximal exercise, without reproduction of symptoms. •  Bain Treadmill Score =13 (Low Risk)    24 hour Holter monitor - 6/14/23: Normal sinus rhythm throughout the duration of the study with an average heart rate of 77 bpm, range 51 bpm to a maximum of 130 bpm.  No ventricular ectopic activity. Supraventricular ectopic activity consisted of 5 premature atrial contractions. No ventricular or supraventricular arrhythmias. No symptoms were reported. 5-day Biotel from 2/17/2023 - 2/22/2023: Predominantly normal sinus rhythm. The average heart rate was 85 bpm, minimum heart rate 61 bpm and a maximum heart rate of 162 bpm.  No atrial fibrillation or atrial flutter. There was no supraventricular ectopic activity. No supraventricular arrhythmias. There were no significant pauses and there is no evidence of AV block. PVC burden was less than 0.01%. There was a single ventricular triplet. There were 14 recorded events which correlated to sinus rhythm and sinus tachycardia. The fastest recorded heart rate of 162 bpm was sinus tachycardia.   There was normal variation of heart rates between day and nighttime hours. ASSESSMENT AND PLAN:  Diagnoses and all orders for this visit:    #. Palpitations  #. Lightheadedness    80-year-old healthy female with no pertinent cardiac history who presents today for follow-up of palpitations and lightheadedness. Symptoms started November 2022 and started occurring on a daily basis. She had a previous 5-day Biotel monitor which showed predominantly normal sinus rhythm. The average heart rate was 85 bpm (61 bpm - 162 bpm). There is no supraventricular ectopic activity or arrhythmias. No evidence of AV block. PVC burden was less than 0.01%. Single ventricular triplet. 14 recorded events correlated to sinus rhythm and sinus tachycardia. In summary, her monitor showed a normal average heart rate of 85 bpm.  There were periods of sinus rhythm and sinus tachycardia for which she had triggered the monitor. Reports no exercise during the monitoring period. She had normal variation in heart rates between night and daytime hours. There were no supraventricular arrhythmias. Lab work shows normal thyroid function and no anemia. Orthostatic vital signs during last office visit with no appreciable change in blood pressure heart rate. ECG shows sinus rhythm with short SC. No preexcitation, Brugada, normal QTc. She was referred for exercise stress testing and another 24-hour Holter monitor. She exercised for 13 minutes and achieved 94% MPHR. She had a normal blood pressure and heart rate response to exercise. Her exercise stress ECG was negative for ischemia. There were no exercise-induced arrhythmias. A 24-hour Holter monitor shows predominantly normal sinus rhythm with no no ventricular ectopic activity. There were 5 premature atrial contractions. No ventricular or supraventricular arrhythmias. No symptoms reported.     We reviewed the results of both her Holter monitor and exercise stress test.  Testing was reassuring and did not show any evidence for exercise-induced arrhythmias and she had an appropriate heart rate response to exercise. Feeling symptoms are less frequent since her last office visit. Staying hydrated and has good sleep hygiene. Continue same recommendations and follow up in the office as needed if symptoms worsen or fail to improve.     Santosh Bedolla MD

## 2023-08-04 ENCOUNTER — OFFICE VISIT (OUTPATIENT)
Dept: FAMILY MEDICINE CLINIC | Facility: CLINIC | Age: 23
End: 2023-08-04
Payer: COMMERCIAL

## 2023-08-04 VITALS
BODY MASS INDEX: 21.51 KG/M2 | HEIGHT: 64 IN | HEART RATE: 84 BPM | WEIGHT: 126 LBS | TEMPERATURE: 98.4 F | DIASTOLIC BLOOD PRESSURE: 78 MMHG | OXYGEN SATURATION: 99 % | SYSTOLIC BLOOD PRESSURE: 122 MMHG

## 2023-08-04 DIAGNOSIS — Z00.00 HEALTH MAINTENANCE EXAMINATION: Primary | ICD-10-CM

## 2023-08-04 PROCEDURE — 99395 PREV VISIT EST AGE 18-39: CPT | Performed by: FAMILY MEDICINE

## 2023-08-04 NOTE — PROGRESS NOTES
Name: Stella Paez      : 2000      MRN: [id number]  Encounter Provider: Sonja Jamison MD  Encounter Date: 2023   Encounter department: [address] [place] [address]. Health maintenance examination  Normal exam    Follow up in 1 year         Subjective     Patient is here for health maintenance exam to enter graduate school. She denies any complaints today. Review of Systems   Constitutional: Negative for activity change, appetite change, fatigue and fever. HENT: Negative for congestion and ear discharge. Respiratory: Negative for cough and shortness of breath. Cardiovascular: Negative for chest pain and palpitations. Gastrointestinal: Negative for diarrhea and nausea. Musculoskeletal: Negative for arthralgias and back pain. Skin: Negative for color change and rash. Neurological: Negative for dizziness and headaches. Psychiatric/Behavioral: Negative for agitation and behavioral problems. Past Medical History:   Diagnosis Date   • Anxiety    • Migraine headache      Past Surgical History:   Procedure Laterality Date   • EYE SURGERY     • FASCIOTOMY Bilateral     legs     Family History   Problem Relation Age of Onset   • Mental illness Mother         Anxiety   • Anxiety disorder Mother    • Asthma Father    • Allergic rhinitis Father    • ADD / ADHD Father    • No Known Problems Brother    • Heart attack Maternal Grandmother    • ADD / ADHD Brother      Social History     Socioeconomic History   • Marital status: Single     Spouse name: None   • Number of children: None   • Years of education: None   • Highest education level: None   Occupational History   • Occupation: Student   Tobacco Use   • Smoking status: Never   • Smokeless tobacco: Never   Vaping Use   • Vaping Use: Never used   Substance and Sexual Activity   • Alcohol use:  Yes   • Drug use: No   • Sexual activity: Yes     Partners: Male     Birth control/protection: Condom Male Other Topics Concern   • None   Social History Narrative    Lives with [address] in [place]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     Social Determinants of Health     Financial Resource Strain: Not on file   Food Insecurity: Not on file   Transportation Needs: Not on file   Physical Activity: Not on file   Stress: Not on file   Social Connections: Not on file   Intimate Partner Violence: Not on file   Housing Stability: Not on file     Current Outpatient Medications on File Prior to Visit   Medication Sig   • escitalopram (LEXAPRO) 10 mg tablet Take 1 tablet (10 mg total) by mouth daily   • Gemmily 1-20 MG-MCG(24) CAPS take 1 tablet by mouth once daily     No Known Allergies  Immunization History   Administered Date(s) Administered   • HPV9 12/11/2017, 07/14/2022   • Influenza Injectable, MDCK, Preservative Free, Quadrivalent, 0.5 mL 10/22/2020   • Tdap 09/05/2017       Objective     /78   Pulse 84   Temp 98.4 °F (36.9 °C)   Ht 5' 4" (1.626 m)   Wt 57.2 kg (126 lb)   SpO2 99%   BMI 21.63 kg/m²     Physical Exam  Constitutional:       General: She is not in acute distress. Appearance: She is well-developed. She is not diaphoretic. HENT:      Head: Normocephalic and atraumatic. Nose: Nose normal.   Eyes:      Conjunctiva/sclera: Conjunctivae normal.      Pupils: Pupils are equal, round, and reactive to light. Cardiovascular:      Rate and Rhythm: Normal rate and regular rhythm. Heart sounds: Normal heart sounds. No murmur heard. Pulmonary:      Effort: Pulmonary effort is normal. No respiratory distress. Breath sounds: Normal breath sounds. No wheezing. Abdominal:      General: Bowel sounds are normal. There is no distension. Palpations: Abdomen is soft. Tenderness: There is no abdominal tenderness. Skin:     General: Skin is warm and dry. Findings: No erythema or rash. Neurological:      Mental Status: She is alert and oriented to person, place, and time. Miranda Mathew MD

## 2023-10-03 PROBLEM — Z00.00 HEALTH MAINTENANCE EXAMINATION: Status: RESOLVED | Noted: 2023-08-04 | Resolved: 2023-10-03

## 2024-01-03 ENCOUNTER — ANNUAL EXAM (OUTPATIENT)
Dept: OBGYN CLINIC | Facility: CLINIC | Age: 24
End: 2024-01-03
Payer: COMMERCIAL

## 2024-01-03 VITALS
WEIGHT: 140 LBS | SYSTOLIC BLOOD PRESSURE: 118 MMHG | DIASTOLIC BLOOD PRESSURE: 64 MMHG | HEIGHT: 64 IN | BODY MASS INDEX: 23.9 KG/M2

## 2024-01-03 DIAGNOSIS — Z12.4 SCREENING FOR CERVICAL CANCER: ICD-10-CM

## 2024-01-03 DIAGNOSIS — Z30.41 ENCOUNTER FOR SURVEILLANCE OF CONTRACEPTIVE PILLS: ICD-10-CM

## 2024-01-03 DIAGNOSIS — Z01.419 ENCOUNTER FOR GYNECOLOGICAL EXAMINATION (GENERAL) (ROUTINE) WITHOUT ABNORMAL FINDINGS: Primary | ICD-10-CM

## 2024-01-03 DIAGNOSIS — N83.209 CYST OF OVARY, UNSPECIFIED LATERALITY: ICD-10-CM

## 2024-01-03 PROBLEM — L70.0 ACNE VULGARIS: Status: ACTIVE | Noted: 2024-01-03

## 2024-01-03 PROCEDURE — G0145 SCR C/V CYTO,THINLAYER,RESCR: HCPCS | Performed by: PHYSICIAN ASSISTANT

## 2024-01-03 PROCEDURE — 99395 PREV VISIT EST AGE 18-39: CPT | Performed by: PHYSICIAN ASSISTANT

## 2024-01-03 RX ORDER — TRETINOIN 0.5 MG/G
CREAM TOPICAL
COMMUNITY
Start: 2023-11-10

## 2024-01-03 RX ORDER — CLINDAMYCIN PHOSPHATE 10 MG/ML
SOLUTION TOPICAL
COMMUNITY
Start: 2023-12-07

## 2024-01-03 RX ORDER — NORETHINDRONE ACETATE AND ETHINYL ESTRADIOL, AND FERROUS FUMARATE 1MG-20(24)
1 KIT ORAL DAILY
Qty: 84 CAPSULE | Refills: 3 | Status: SHIPPED | OUTPATIENT
Start: 2024-01-03

## 2024-01-03 NOTE — PROGRESS NOTES
ASSESSMENT & PLAN: Geraldine Lemon is a 23 y.o.  with normal gynecologic exam.    1.  Routine well woman exam done today  2.  Pap and HPV:  The patient's last pap was never.    Pap was done today.    Current ASCCP Guidelines reviewed.   3.  STD testing  was not done  - offered, counseled, pt declined  4.  Gardasil vaccine received  5. The following were reviewed in today's visit: breast self exam, STD testing, use and side effects of OCPs, adequate intake of calcium and vitamin D, exercise, healthy diet, and age-appropriate recommendations regarding screenings and prevention.  6.  Patient reports in ED in [place] in August for pelvic pain, reportedly had ovarian cyst everything else was normal.  No records.  Patient states she was supposed to have follow-up for ovarian cyst resolution.  Pelvic ultrasound ordered for follow-up.  7.  Patient desires to restart OCP.  Did well with it previously, ran out of refills about 4 to 5 months ago.  No obvious immediate contraindication.  Patient to start first pill in new pack first day of next menstruation which is due within the next few days.  Stressed importance of compliance taking pill every day around the same time and avoid missing pills.  Condom use for STD prevention.    RTO 1 year for annual, sooner problems arise in the interim.    CC:  Annual Gynecologic Examination    HPI: Geraldine Lemon is a 23 y.o.  who presents for annual gynecologic examination.      She has the following concerns:    She ran out of BC since August. Desires to restart.  States periods have been a little longer, acne worse, and had episode of ovarian cysts since being off BC.   Went to ED in [place] in august for pelvic pain. Was told needed f/u US for resolution of ovarian cyst. Pain has since resolved and has not reoccurred.       Healthy diet Yes; drinks primarily water  Exercise Yes; 3 days a week  Vitamins No    Patient's last menstrual period was 2023.    Menses  frequency: regular, once a month  Length of bleedin  Bleeding quality: moderate  Sxs with menses: acne, mild cramping.   Denies intermenstrual bleeding.       Health Maintenance:      She does not perform regular monthly self breast exams.  Denies breast pain, lump, skin change or nipple discharge.    She feels safe at home.    Past Medical History:   Diagnosis Date    Anxiety 2015    Migraine headache        Past Surgical History:   Procedure Laterality Date    EYE SURGERY      FASCIOTOMY Bilateral     legs       OB/Gyn History:    Pt does not have menstrual issues.     History of sexually transmitted infection: denies.  History of abnormal pap smears: N/a   Patient is currently sexually active.    The current method of family planning is OCP (estrogen/progesterone).    OB History          0    Para   0    Term   0       0    AB   0    Living   0         SAB   0    IAB   0    Ectopic   0    Multiple   0    Live Births   0                 Family History   Problem Relation Age of Onset    Mental illness Mother         Anxiety    Anxiety disorder Mother     Asthma Father     Allergic rhinitis Father     ADD / ADHD Father     No Known Problems Brother     ADD / ADHD Brother     Heart attack Maternal Grandmother     Ovarian cancer Maternal Aunt        Family history of Breast/Uterine/Ovarian/Colon Cancer: ovarian ca - mom's sister; patient thinks mom had hysterectomy due to precancer in uterus?    Social History:  Social History     Socioeconomic History    Marital status: Single     Spouse name: Not on file    Number of children: Not on file    Years of education: Not on file    Highest education level: Not on file   Occupational History    Occupation: Student   Tobacco Use    Smoking status: Never    Smokeless tobacco: Never   Vaping Use    Vaping status: Never Used   Substance and Sexual Activity    Alcohol use: Yes    Drug use: No    Sexual activity: Yes     Partners: Male     Birth  control/protection: Condom Male, OCP   Other Topics Concern    Not on file   Social History Narrative    Lives with Dad     [employer] in [place]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     Social Determinants of Health     Financial Resource Strain: Not on file   Food Insecurity: Not on file   Transportation Needs: Not on file   Physical Activity: Not on file   Stress: Not on file   Social Connections: Not on file   Intimate Partner Violence: Not on file   Housing Stability: Not on file         No Known Allergies      Current Outpatient Medications:     clindamycin (CLEOCIN T) 1 %, APPLY A THIN LAYER TO THE AFFECTED AREA(S) OF THE BACK BY TOPICAL ROUTE 2 TIMES PER DAY, Disp: , Rfl:     escitalopram (LEXAPRO) 10 mg tablet, Take 1 tablet (10 mg total) by mouth daily, Disp: 90 tablet, Rfl: 1    Norethin Ace-Eth Estrad-FE (Gemmily) 1-20 MG-MCG(24) CAPS, Take 1 tablet by mouth daily, Disp: 84 capsule, Rfl: 3    tretinoin (REFISSA) 0.05 % cream, PLEASE SEE ATTACHED FOR DETAILED DIRECTIONS, Disp: , Rfl:     Review of Systems:  Constitutional :no fever, feels well, no tiredness, no recent weight gain or loss  ENT: no ear ache, no loss of hearing, no nosebleeds or nasal discharge, no sore throat or hoarseness.  Cardiovascular: no complaints of slow or fast heart beat, no chest pain, no palpitations, no leg claudication or lower extremity edema.  Respiratory: no complaints of shortness of shortness of breath, no BALDWIN  Breasts:no complaints of breast pain, breast lump, or nipple discharge  Gastrointestinal: no complaints of abdominal pain, constipation, nausea, vomiting, or diarrhea or bloody stools  Genitourinary : no complaints of dysuria, incontinence, pelvic pain, no dysmenorrhea, vaginal discharge/itch/odor or abnormal vaginal bleeding and as noted in HPI.  Musculoskeletal: no complaints of arthralgia, no myalgia, no joint swelling or stiffness, no limb pain or swelling.  Integumentary: no complaints of skin rash or lesion, itching  "or dry skin  Neurological: no complaints of headache, no confusion, no numbness or tingling, no dizziness or fainting  Mental Health: no worsening anxiety, depression, SI    Objective      /64 (BP Location: Left arm)   Ht 5' 4\" (1.626 m)   Wt 63.5 kg (140 lb)   LMP 12/07/2023   BMI 24.03 kg/m²     General:   appears stated age, cooperative, alert normal mood and affect. BMI 24   Neck: normal, supple,trachea midline, no masses.  Thyroid palpated normal.   Heart: regular rate and rhythm, S1, S2 normal, no murmur, click, rub or gallop   Lungs: clear to auscultation bilaterally   Breasts: normal appearance, no masses or tenderness, No nipple retraction or dimpling, No nipple discharge or bleeding, No axillary or supraclavicular adenopathy, Normal to palpation without dominant masses   Abdomen: soft, non-tender, without masses or organomegaly   Vulva: normal female genitalia, no lesions   Vagina: normal vagina, no discharge, exudate, lesion, or erythema   Urethra: normal   Cervix: Normal, no discharge. PAP done. Nontender.   Uterus: normal size, contour, position, consistency, mobility, non-tender   Adnexa: no mass, fullness, tenderness   Lymphatic palpation of lymph nodes in neck, axilla, groin and/or other locations: no lymphadenopathy or masses noted   Skin normal skin turgor and no rashes.   Psychiatric orientation to person, place, and time: normal. mood and affect: normal         "

## 2024-01-10 LAB
LAB AP GYN PRIMARY INTERPRETATION: NORMAL
Lab: NORMAL

## 2024-02-16 ENCOUNTER — RA CDI HCC (OUTPATIENT)
Dept: OTHER | Facility: HOSPITAL | Age: 24
End: 2024-02-16

## 2024-02-16 NOTE — PROGRESS NOTES
HCC coding opportunities          Chart Reviewed number of suggestions sent to Provider: 1  Depression     Patients Insurance        Commercial Insurance: [employer]

## 2024-06-18 NOTE — PROGRESS NOTES
"Ambulatory Visit  Name: Geraldine Lemon      : 2000      MRN: [id number]  Encounter Provider: Roshni Oh DO  Encounter Date: 2024   Encounter department: [facility]    Assessment & Plan   1. Palpitations  Assessment & Plan:  Unclear etiology of symptoms. In office EKG unchanged from previous. Will update labs to evaluate for anemia, thyroid dysfunction, vitamin deficiency, electrolyte disturbance. Also placed 5 day holter to monitor for arrhythmia. Will f/u once results available -- if benign, consider f/u with Cardio  2. Tachycardia  -     CBC and differential; Future  -     Iron Panel (Includes Ferritin, Iron Sat%, Iron, and TIBC); Future  -     Comprehensive metabolic panel; Future  -     Magnesium; Future  -     TSH, 3rd generation with Free T4 reflex; Future  -     Vitamin D 25 hydroxy; Future  -     Vitamin B12; Future  3. Bradycardia  -     CBC and differential; Future  -     Iron Panel (Includes Ferritin, Iron Sat%, Iron, and TIBC); Future  -     Comprehensive metabolic panel; Future  -     Magnesium; Future  -     TSH, 3rd generation with Free T4 reflex; Future  -     Vitamin D 25 hydroxy; Future  -     Vitamin B12; Future  4. Healthcare maintenance    Health Maintenance:   BP WNL   STD screening deferred per pt request   Pap UTD   Vaccinations: TDap UTD, COVID completed primary + booster   Encouraged regular physical activity, varied diet, and regular dental/eye exams          History of Present Illness     HPI    Pt notes that over the past month, she has been having episodes of fluctuating heart rate (50s to 130s) -- feels palpitations even when her heart rate is not high   Feels her heart rate is going higher than would be expected for light exercise and she can't finish her workout   Doesn't feel dizzy, but feels \"out of it\" \"cloudy\" especially when heart rate is low   Sometimes has associated shortness of breath, feels shaky   Drinking plenty of water, eating " "regularly throughout the day     Of note, pt had similar symptoms last year -- had benign holter, stress test.     Review of Systems   Constitutional:  Positive for unexpected weight change (trouble losing weight).   HENT:  Negative for congestion, ear pain, rhinorrhea and sore throat.    Eyes:  Negative for visual disturbance.   Respiratory:  Positive for shortness of breath.    Cardiovascular:  Positive for palpitations. Negative for chest pain.   Gastrointestinal:  Negative for abdominal pain, blood in stool, constipation, diarrhea and nausea.   Endocrine: Negative for polyuria.   Genitourinary:  Negative for dysuria, hematuria and menstrual problem.   Neurological:  Positive for tremors and headaches (daily, not necessarily associated with cardiac symptoms). Negative for dizziness.   Psychiatric/Behavioral:  Positive for sleep disturbance (trouble falling/staying asleep).      Medical History Reviewed by provider this encounter:  Tobacco  Allergies  Meds  Problems  Med Hx  Surg Hx  Fam Hx       Objective     /68   Pulse 72   Temp 98.4 °F (36.9 °C)   Ht 5' 4\" (1.626 m)   Wt 62.8 kg (138 lb 6.4 oz)   SpO2 98%   BMI 23.76 kg/m²     Physical Exam  Vitals and nursing note reviewed.   Constitutional:       General: She is not in acute distress.     Appearance: She is well-developed.   HENT:      Head: Normocephalic and atraumatic.      Right Ear: Tympanic membrane, ear canal and external ear normal.      Left Ear: Tympanic membrane, ear canal and external ear normal.      Nose: Nose normal. No rhinorrhea.      Mouth/Throat:      Mouth: Mucous membranes are moist.      Pharynx: No oropharyngeal exudate or posterior oropharyngeal erythema.   Eyes:      Conjunctiva/sclera: Conjunctivae normal.   Neck:      Thyroid: No thyromegaly.   Cardiovascular:      Rate and Rhythm: Normal rate and regular rhythm.   Pulmonary:      Effort: Pulmonary effort is normal. No respiratory distress.      Breath sounds: " Normal breath sounds.   Abdominal:      General: Bowel sounds are normal. There is no distension.      Palpations: Abdomen is soft.      Tenderness: There is no abdominal tenderness.   Musculoskeletal:      Right lower leg: No edema.      Left lower leg: No edema.   Lymphadenopathy:      Cervical: No cervical adenopathy.   Skin:     General: Skin is warm and dry.   Neurological:      Mental Status: She is alert.      Comments: Grossly intact   Psychiatric:         Mood and Affect: Mood normal.       Administrative Statements

## 2024-06-19 ENCOUNTER — OFFICE VISIT (OUTPATIENT)
Dept: FAMILY MEDICINE CLINIC | Facility: CLINIC | Age: 24
End: 2024-06-19
Payer: COMMERCIAL

## 2024-06-19 ENCOUNTER — APPOINTMENT (OUTPATIENT)
Dept: LAB | Facility: CLINIC | Age: 24
End: 2024-06-19
Payer: COMMERCIAL

## 2024-06-19 VITALS
WEIGHT: 138.4 LBS | BODY MASS INDEX: 23.63 KG/M2 | DIASTOLIC BLOOD PRESSURE: 68 MMHG | OXYGEN SATURATION: 98 % | TEMPERATURE: 98.4 F | HEIGHT: 64 IN | SYSTOLIC BLOOD PRESSURE: 118 MMHG | HEART RATE: 72 BPM

## 2024-06-19 DIAGNOSIS — R00.1 BRADYCARDIA: ICD-10-CM

## 2024-06-19 DIAGNOSIS — R00.2 PALPITATIONS: Primary | ICD-10-CM

## 2024-06-19 DIAGNOSIS — Z00.00 HEALTHCARE MAINTENANCE: ICD-10-CM

## 2024-06-19 DIAGNOSIS — R00.0 TACHYCARDIA: ICD-10-CM

## 2024-06-19 PROBLEM — R42 LIGHTHEADEDNESS: Status: RESOLVED | Noted: 2023-05-30 | Resolved: 2024-06-19

## 2024-06-19 LAB
25(OH)D3 SERPL-MCNC: 13.3 NG/ML (ref 30–100)
ALBUMIN SERPL BCG-MCNC: 4.5 G/DL (ref 3.5–5)
ALP SERPL-CCNC: 41 U/L (ref 34–104)
ALT SERPL W P-5'-P-CCNC: 18 U/L (ref 7–52)
ANION GAP SERPL CALCULATED.3IONS-SCNC: 9 MMOL/L (ref 4–13)
AST SERPL W P-5'-P-CCNC: 18 U/L (ref 13–39)
BASOPHILS # BLD AUTO: 0.05 THOUSANDS/ÂΜL (ref 0–0.1)
BASOPHILS NFR BLD AUTO: 1 % (ref 0–1)
BILIRUB SERPL-MCNC: 0.32 MG/DL (ref 0.2–1)
BUN SERPL-MCNC: 11 MG/DL (ref 5–25)
CALCIUM SERPL-MCNC: 9.6 MG/DL (ref 8.4–10.2)
CHLORIDE SERPL-SCNC: 106 MMOL/L (ref 96–108)
CO2 SERPL-SCNC: 28 MMOL/L (ref 21–32)
CREAT SERPL-MCNC: 0.89 MG/DL (ref 0.6–1.3)
EOSINOPHIL # BLD AUTO: 0.05 THOUSAND/ÂΜL (ref 0–0.61)
EOSINOPHIL NFR BLD AUTO: 1 % (ref 0–6)
ERYTHROCYTE [DISTWIDTH] IN BLOOD BY AUTOMATED COUNT: 12.3 % (ref 11.6–15.1)
FERRITIN SERPL-MCNC: 14 NG/ML (ref 11–307)
GFR SERPL CREATININE-BSD FRML MDRD: 91 ML/MIN/1.73SQ M
GLUCOSE SERPL-MCNC: 82 MG/DL (ref 65–140)
HCT VFR BLD AUTO: 44.5 % (ref 34.8–46.1)
HGB BLD-MCNC: 14.4 G/DL (ref 11.5–15.4)
IMM GRANULOCYTES # BLD AUTO: 0.02 THOUSAND/UL (ref 0–0.2)
IMM GRANULOCYTES NFR BLD AUTO: 0 % (ref 0–2)
IRON SATN MFR SERPL: 30 % (ref 15–50)
IRON SERPL-MCNC: 125 UG/DL (ref 50–212)
LYMPHOCYTES # BLD AUTO: 2.34 THOUSANDS/ÂΜL (ref 0.6–4.47)
LYMPHOCYTES NFR BLD AUTO: 39 % (ref 14–44)
MAGNESIUM SERPL-MCNC: 2.1 MG/DL (ref 1.9–2.7)
MCH RBC QN AUTO: 29.1 PG (ref 26.8–34.3)
MCHC RBC AUTO-ENTMCNC: 32.4 G/DL (ref 31.4–37.4)
MCV RBC AUTO: 90 FL (ref 82–98)
MONOCYTES # BLD AUTO: 0.49 THOUSAND/ÂΜL (ref 0.17–1.22)
MONOCYTES NFR BLD AUTO: 8 % (ref 4–12)
NEUTROPHILS # BLD AUTO: 3.06 THOUSANDS/ÂΜL (ref 1.85–7.62)
NEUTS SEG NFR BLD AUTO: 51 % (ref 43–75)
NRBC BLD AUTO-RTO: 0 /100 WBCS
PLATELET # BLD AUTO: 234 THOUSANDS/UL (ref 149–390)
PMV BLD AUTO: 12 FL (ref 8.9–12.7)
POTASSIUM SERPL-SCNC: 4.7 MMOL/L (ref 3.5–5.3)
PROT SERPL-MCNC: 7.1 G/DL (ref 6.4–8.4)
RBC # BLD AUTO: 4.95 MILLION/UL (ref 3.81–5.12)
SODIUM SERPL-SCNC: 143 MMOL/L (ref 135–147)
TIBC SERPL-MCNC: 411 UG/DL (ref 250–450)
TSH SERPL DL<=0.05 MIU/L-ACNC: 1.81 UIU/ML (ref 0.45–4.5)
UIBC SERPL-MCNC: 286 UG/DL (ref 155–355)
VIT B12 SERPL-MCNC: 478 PG/ML (ref 180–914)
WBC # BLD AUTO: 6.01 THOUSAND/UL (ref 4.31–10.16)

## 2024-06-19 PROCEDURE — 83540 ASSAY OF IRON: CPT

## 2024-06-19 PROCEDURE — 83550 IRON BINDING TEST: CPT

## 2024-06-19 PROCEDURE — 83735 ASSAY OF MAGNESIUM: CPT

## 2024-06-19 PROCEDURE — 82306 VITAMIN D 25 HYDROXY: CPT

## 2024-06-19 PROCEDURE — 82607 VITAMIN B-12: CPT

## 2024-06-19 PROCEDURE — 93000 ELECTROCARDIOGRAM COMPLETE: CPT | Performed by: FAMILY MEDICINE

## 2024-06-19 PROCEDURE — 99214 OFFICE O/P EST MOD 30 MIN: CPT | Performed by: FAMILY MEDICINE

## 2024-06-19 PROCEDURE — 82728 ASSAY OF FERRITIN: CPT

## 2024-06-19 PROCEDURE — 85025 COMPLETE CBC W/AUTO DIFF WBC: CPT

## 2024-06-19 PROCEDURE — 36415 COLL VENOUS BLD VENIPUNCTURE: CPT

## 2024-06-19 PROCEDURE — 80053 COMPREHEN METABOLIC PANEL: CPT

## 2024-06-19 PROCEDURE — 99395 PREV VISIT EST AGE 18-39: CPT | Performed by: FAMILY MEDICINE

## 2024-06-19 PROCEDURE — 84443 ASSAY THYROID STIM HORMONE: CPT

## 2024-06-19 NOTE — ASSESSMENT & PLAN NOTE
Unclear etiology of symptoms. In office EKG unchanged from previous. Will update labs to evaluate for anemia, thyroid dysfunction, vitamin deficiency, electrolyte disturbance. Also placed 5 day holter to monitor for arrhythmia. Will f/u once results available -- if benign, consider f/u with Cardio

## 2024-06-20 ENCOUNTER — TELEPHONE (OUTPATIENT)
Dept: FAMILY MEDICINE CLINIC | Facility: CLINIC | Age: 24
End: 2024-06-20

## 2024-06-20 NOTE — TELEPHONE ENCOUNTER
LMOMTCB    ----- Message from Roshni Oh DO sent at 6/20/2024  7:45 AM EDT -----  Please let pt know:   Vit D low -- I would suggest a high dose supplement once weekly x12 weeks, then switching to a lower dose daily supplement. I can send this in if she is agreeable.   Thyroid function normal  Kidney and liver function normal, blood sugar not in diabetic range, electrolytes normal  Not anemia, iron normal, ferritin (iron storage) a bit low -- could consider daily multivitamin with iron/increasing iron-rich foods (beans/dark leafy greens) in diet.

## 2024-06-28 ENCOUNTER — TELEPHONE (OUTPATIENT)
Dept: FAMILY MEDICINE CLINIC | Facility: CLINIC | Age: 24
End: 2024-06-28

## 2024-12-01 DIAGNOSIS — Z30.41 ENCOUNTER FOR SURVEILLANCE OF CONTRACEPTIVE PILLS: ICD-10-CM

## 2024-12-02 DIAGNOSIS — Z30.41 ENCOUNTER FOR SURVEILLANCE OF CONTRACEPTIVE PILLS: ICD-10-CM

## 2024-12-02 RX ORDER — NORETHINDRONE ACETATE AND ETHINYL ESTRADIOL, AND FERROUS FUMARATE 1MG-20(24)
1 KIT ORAL DAILY
Qty: 28 CAPSULE | Refills: 0 | Status: SHIPPED | OUTPATIENT
Start: 2024-12-02

## 2024-12-02 NOTE — TELEPHONE ENCOUNTER
Reason for call:   [x] Refill   [] Prior Auth  [] Other:     Office:   [] PCP/Provider -   [x] Specialty/Provider - OBGYN    Medication: GEMMILY    Dose/Frequency: 1-20 MG    Quantity: 84    Pharmacy:   [facility]/pharmacy #[id number] [facility] [address] [address]  Phone: [phone]  Fax: [phone]  CYNTHIA #: [id number]     Does the patient have enough for 3 days?   [] Yes   [x] No - Send as HP to POD

## 2024-12-03 RX ORDER — NORETHINDRONE ACETATE AND ETHINYL ESTRADIOL, AND FERROUS FUMARATE 1MG-20(24)
1 KIT ORAL DAILY
Qty: 84 CAPSULE | Refills: 0 | Status: SHIPPED | OUTPATIENT
Start: 2024-12-03

## 2024-12-03 NOTE — TELEPHONE ENCOUNTER
Patient needs an appointment. Please contact the patient to schedule an appointment. Last office visit:1/3/24

## 2024-12-26 DIAGNOSIS — Z30.41 ENCOUNTER FOR SURVEILLANCE OF CONTRACEPTIVE PILLS: ICD-10-CM

## 2024-12-27 RX ORDER — NORETHINDRONE ACETATE AND ETHINYL ESTRADIOL, AND FERROUS FUMARATE 1MG-20(24)
1 KIT ORAL DAILY
Qty: 28 CAPSULE | Refills: 0 | Status: SHIPPED | OUTPATIENT
Start: 2024-12-27

## 2025-01-08 ENCOUNTER — ANNUAL EXAM (OUTPATIENT)
Dept: OBGYN CLINIC | Facility: CLINIC | Age: 25
End: 2025-01-08
Payer: COMMERCIAL

## 2025-01-08 VITALS
BODY MASS INDEX: 24.72 KG/M2 | SYSTOLIC BLOOD PRESSURE: 104 MMHG | DIASTOLIC BLOOD PRESSURE: 62 MMHG | HEIGHT: 64 IN | WEIGHT: 144.8 LBS

## 2025-01-08 DIAGNOSIS — Z01.419 ENCOUNTER FOR GYNECOLOGICAL EXAMINATION (GENERAL) (ROUTINE) WITHOUT ABNORMAL FINDINGS: Primary | ICD-10-CM

## 2025-01-08 DIAGNOSIS — Z30.41 ENCOUNTER FOR SURVEILLANCE OF CONTRACEPTIVE PILLS: ICD-10-CM

## 2025-01-08 PROCEDURE — 99395 PREV VISIT EST AGE 18-39: CPT | Performed by: PHYSICIAN ASSISTANT

## 2025-01-08 RX ORDER — NORETHINDRONE ACETATE AND ETHINYL ESTRADIOL, AND FERROUS FUMARATE 1MG-20(24)
KIT ORAL
Qty: 84 CAPSULE | Refills: 3 | Status: SHIPPED | OUTPATIENT
Start: 2025-01-08

## 2025-01-08 NOTE — PROGRESS NOTES
ASSESSMENT & PLAN: Geraldine Lemon is a 24 y.o.  with normal gynecologic exam.    1.  Routine well woman exam done today  2.  Pap and HPV:  The patient's last pap was 2024.    It was normal.    Pap was not done today.    Current ASCCP Guidelines reviewed.   3.  STD testing offered and declined today.  4.  Gardasil vaccine have been received but dates noted > age 15 and only 2, unsure if pt had 1 prior and not in epic. Would advise pt to check.  5. The following were reviewed in today's visit: breast self exam, STD testing, use and side effects of OCPs, adequate intake of calcium and vitamin D, exercise, healthy diet, and age-appropriate recommendations regarding screenings and prevention.  6.  Patient's headaches and OCP were all reviewed in detail today.  She has history of migraine without aura but had not been an issue for years.  She has been OCP, the same one, most notably for several years as well without change.  She notes over the past 6 months cyclical migraine with some floaters the day before or day of her menstruation and spontaneously resolves.  She does note occasional headaches throughout each week but is only exclusively a mild headache and typically goes away on its own.  She does not follow with neurology or PCP for this.  Discussed potentially discontinuation of OCP and potential risks of combination oral contraceptive use migraine with visual deficits and patient expresses understanding but desires to continue for now since she is doing very well with the birth control, needs something for contraception and declines other contraception methods or alternative progestin only options at this time.       We will continue patient on OCP for now, with refill sent to pharmacy.   We discussed taking OCP continuously with just the hormonal active pills in the first 2 packs and utilizing entire pack to have a bleed in the third pack and we will see if this decreases frequency of the migraine for  her.  She is also agreeable to follow-up with her PCP as well.  She will call if any issues or needs to consider other BC options.  otherwise follow-up for next routine annual next year.      CC:  Annual Gynecologic Examination    HPI: Geraldine Lemon is a 24 y.o.  who presents for annual gynecologic examination.      She has the following concerns:  every month since July she has noticed a HA with spots in vision 1 day or day of her period.  States it is consistent monthly, cyclically. She will take tylenol, sometimes gets a toradol inj at a urgent care.  She does note hx of migraines, but has been fine for a while.  States she does get HA's throughout the week but are just HA's no visual issues or neurological issues.  She has been ocp for years now and never had major issue until now.       Healthy diet Yes  Exercise Yes  Vitamins Yes    Patient's last menstrual period was 2024 (exact date).    Menses frequency: regular once a month w/ placebo  Length of bleedin days  Bleeding quality: average  Sxs with menses: migraine 1 days before, mild cramping.   Denies irregular bleeding/spotting, or postcoital bleeding.       Health Maintenance:      She does not perform regular monthly self breast exams.  Denies breast pain, lump, skin change or nipple discharge.    She feels safe at home. Feels safe in relationship with partner.     Past Medical History:   Diagnosis Date    Anxiety     Migraine headache        Past Surgical History:   Procedure Laterality Date    EYE SURGERY      FASCIOTOMY Bilateral     legs       OB/Gyn History:    Pt does not have menstrual issues.     History of sexually transmitted infection: denies.  History of abnormal pap smears: No .    Patient is currently sexually active.    The current method of family planning is OCP (estrogen/progesterone).    OB History          0    Para   0    Term   0       0    AB   0    Living   0         SAB   0    IAB   0    Ectopic    0    Multiple   0    Live Births   0                 Family History   Problem Relation Age of Onset    Mental illness Mother         Anxiety    Anxiety disorder Mother     Asthma Father     Allergic rhinitis Father     ADD / ADHD Father     No Known Problems Brother     ADD / ADHD Brother     Heart attack Maternal Grandmother     Ovarian cancer Maternal Aunt        Family history of Breast/Uterine/Ovarian/Colon Cancer: as above    Social History:  Social History     Socioeconomic History    Marital status: Single     Spouse name: Not on file    Number of children: Not on file    Years of education: Not on file    Highest education level: Not on file   Occupational History    Occupation: Student   Tobacco Use    Smoking status: Never    Smokeless tobacco: Never   Vaping Use    Vaping status: Never Used   Substance and Sexual Activity    Alcohol use: Yes     Comment: socially    Drug use: No    Sexual activity: Yes     Partners: Male     Birth control/protection: Condom Male, OCP   Other Topics Concern    Not on file   Social History Narrative    Lives with Dad     [employer] in [place]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     Social Drivers of Health     Financial Resource Strain: Not on file   Food Insecurity: Not on file   Transportation Needs: Not on file   Physical Activity: Not on file   Stress: Not on file   Social Connections: Not on file   Intimate Partner Violence: Not on file (8/24/2023)   Housing Stability: Not on file         Allergies   Allergen Reactions    Shellfish-Derived Products - Food Allergy Hives     Can experience vomiting         Current Outpatient Medications:     Norethin Ace-Eth Estrad-FE (Gemmily) 1-20 MG-MCG(24) CAPS, Take 1 tab PO daily; Take the hormonal pills only continuously for the first 2 packs and then complete the third pack in entirety including placebo pills., Disp: 84 capsule, Rfl: 3    Review of Systems:  Constitutional :no fever, feels well, no tiredness, no recent weight gain or  "loss  ENT: no ear ache, no loss of hearing, no nosebleeds or nasal discharge, no sore throat or hoarseness.  Cardiovascular: no complaints of slow or fast heart beat, no chest pain, no palpitations, no leg claudication or lower extremity edema.  Respiratory: no complaints of shortness of shortness of breath, no BALDWIN  Breasts:no complaints of breast pain, breast lump, or nipple discharge  Gastrointestinal: no complaints of abdominal pain, constipation, nausea, vomiting, or diarrhea or bloody stools  Genitourinary : no complaints of dysuria, incontinence, pelvic pain, no dysmenorrhea, vaginal discharge/itching/odor or abnormal vaginal bleeding.I.  Musculoskeletal: no complaints of arthralgia, no myalgia, no joint swelling or stiffness, no limb pain or swelling.  Integumentary: no complaints of skin rash or lesion, itching or dry skin  Neurological: HA's as in HPI, cyclical HA 1 day or day of period new w/ occasional floaters. Hx migraine w/o aura. Has been on OCP for several years without problems or change. no complaints of no confusion, no numbness or tingling, no dizziness or fainting  Mental Health: no anxiety, depression, SI    Objective      /62 (BP Location: Left arm, Patient Position: Sitting, Cuff Size: Adult)   Ht 5' 4\" (1.626 m)   Wt 65.7 kg (144 lb 12.8 oz)   LMP 12/29/2024 (Exact Date)   BMI 24.85 kg/m²     General:   appears stated age, cooperative, alert normal mood and affect.  BMI 24.85   Neck: normal, supple,trachea midline, no masses.  Thyroid palpated normal.   Heart: regular rate and rhythm, S1, S2 normal, no murmur, click, rub or gallop   Lungs: clear to auscultation bilaterally   Breasts: normal appearance, no masses or tenderness, No nipple retraction or dimpling, No nipple discharge or bleeding, No axillary or supraclavicular adenopathy, Normal to palpation without dominant masses   Abdomen: soft, non-tender, without masses or organomegaly   Vulva: normal female genitalia, no lesions "   Vagina: normal vagina, no discharge, exudate, lesion, or erythema   Urethra: normal   Cervix: Normal, no discharge. Nontender.   Uterus: normal size, contour, position, consistency, mobility, non-tender   Adnexa: no mass, fullness, tenderness   Lymphatic palpation of lymph nodes in neck, axilla, groin and/or other locations: no lymphadenopathy or masses noted   Skin normal skin turgor and no rashes.   Psychiatric orientation to person, place, and time: normal. mood and affect: normal

## 2025-02-28 ENCOUNTER — RA CDI HCC (OUTPATIENT)
Dept: OTHER | Facility: HOSPITAL | Age: 25
End: 2025-02-28

## 2025-02-28 NOTE — PROGRESS NOTES
HCC coding opportunities       Chart reviewed, no opportunity found: CHART REVIEWED, NO OPPORTUNITY FOUND        Patients Insurance        Commercial Insurance: Member [employer]k Insurance

## 2025-03-06 NOTE — PROGRESS NOTES
Name: Geraldine Lemon      : 2000      MRN: [id number]  Encounter Provider: Roshni Oh DO  Encounter Date: 3/7/2025   Encounter department: [facility]  :  Assessment & Plan  Vomiting, unspecified vomiting type, unspecified whether nausea present  History highly suggestive of new onset food allergy. Encouraged to refrain from eating, will send Epipen to keep on hand for accidental exposure. Will also update food allergy panel to evaluate for any other reactions.   Orders:  •  Allergen Shellfish Panel; Future  •  Food Allergy Profile; Future    Hives    Orders:  •  Allergen Shellfish Panel; Future  •  Food Allergy Profile; Future    Throat tightness    Orders:  •  Allergen Shellfish Panel; Future  •  Food Allergy Profile; Future    Migraine with aura and without status migrainosus, not intractable  Reviewed contraindication for COCPs with migraine with aura, given increased stroke risk. Encouraged to f/u with Gyn to review alternatives (did discuss mini pill, Depo, Nexplanon, IUD).        Anxiety    Orders:  •  CBC and differential; Future  •  TSH, 3rd generation with Free T4 reflex; Future    Vitamin D deficiency    Orders:  •  CBC and differential; Future  •  Vitamin D 25 hydroxy; Future    Screening for diabetes mellitus    Orders:  •  Comprehensive metabolic panel; Future    Screening, lipid    Orders:  •  Lipid panel; Future    Shellfish allergy    Orders:  •  EPINEPHrine (EPIPEN) 0.3 mg/0.3 mL SOAJ; Inject 0.3 mL (0.3 mg total) into a muscle once for 1 dose           History of Present Illness   HPI    Pt presents due to concern for new allergy     Has had three episodes of reaction when eating shellfish (scallops, shrimp, crab/lobster). Initially had vomiting, hives. With the last episode, had tongue itching/throat tightness. Had to take Benadryl.     Also wondering whether she should stop her OCPs -- had discussed with gynecology, who recommended she stop, as she has been  "having migraine with aura prior to her menses.     Pt would like to update labs     Review of Systems   HENT:  Trouble swallowing: throat tightness.    Gastrointestinal:  Positive for vomiting.   Skin:  Positive for rash.       Objective   /78   Pulse 73   Temp 98.4 °F (36.9 °C)   Ht 5' 4\" (1.626 m)   Wt 66.7 kg (147 lb)   SpO2 99%   BMI 25.23 kg/m²      Physical Exam  Vitals and nursing note reviewed.   Constitutional:       General: She is not in acute distress.     Appearance: Normal appearance.   Pulmonary:      Effort: Pulmonary effort is normal. No respiratory distress.   Neurological:      Mental Status: She is alert.      Comments: Grossly intact   Psychiatric:         Mood and Affect: Mood normal.         "

## 2025-03-07 ENCOUNTER — OFFICE VISIT (OUTPATIENT)
Dept: FAMILY MEDICINE CLINIC | Facility: CLINIC | Age: 25
End: 2025-03-07
Payer: COMMERCIAL

## 2025-03-07 VITALS
OXYGEN SATURATION: 99 % | SYSTOLIC BLOOD PRESSURE: 128 MMHG | HEART RATE: 73 BPM | BODY MASS INDEX: 25.1 KG/M2 | TEMPERATURE: 98.4 F | HEIGHT: 64 IN | DIASTOLIC BLOOD PRESSURE: 78 MMHG | WEIGHT: 147 LBS

## 2025-03-07 DIAGNOSIS — L50.9 HIVES: ICD-10-CM

## 2025-03-07 DIAGNOSIS — Z91.013 SHELLFISH ALLERGY: ICD-10-CM

## 2025-03-07 DIAGNOSIS — G43.109 MIGRAINE WITH AURA AND WITHOUT STATUS MIGRAINOSUS, NOT INTRACTABLE: ICD-10-CM

## 2025-03-07 DIAGNOSIS — Z13.1 SCREENING FOR DIABETES MELLITUS: ICD-10-CM

## 2025-03-07 DIAGNOSIS — R11.10 VOMITING, UNSPECIFIED VOMITING TYPE, UNSPECIFIED WHETHER NAUSEA PRESENT: Primary | ICD-10-CM

## 2025-03-07 DIAGNOSIS — E55.9 VITAMIN D DEFICIENCY: ICD-10-CM

## 2025-03-07 DIAGNOSIS — F41.9 ANXIETY: ICD-10-CM

## 2025-03-07 DIAGNOSIS — Z13.220 SCREENING, LIPID: ICD-10-CM

## 2025-03-07 DIAGNOSIS — R09.89 THROAT TIGHTNESS: ICD-10-CM

## 2025-03-07 PROCEDURE — 99213 OFFICE O/P EST LOW 20 MIN: CPT | Performed by: FAMILY MEDICINE

## 2025-03-07 RX ORDER — EPINEPHRINE 0.3 MG/.3ML
0.3 INJECTION SUBCUTANEOUS ONCE
Qty: 0.6 ML | Refills: 0 | Status: SHIPPED | OUTPATIENT
Start: 2025-03-07 | End: 2025-03-07

## 2025-03-12 ENCOUNTER — RESULTS FOLLOW-UP (OUTPATIENT)
Dept: FAMILY MEDICINE CLINIC | Facility: CLINIC | Age: 25
End: 2025-03-12

## 2025-03-12 DIAGNOSIS — E55.9 VITAMIN D DEFICIENCY: Primary | ICD-10-CM

## 2025-03-12 LAB
25(OH)D3+25(OH)D2 SERPL-MCNC: 16.4 NG/ML (ref 30–100)
ALBUMIN SERPL-MCNC: 4.3 G/DL (ref 4–5)
ALMOND IGE QN: <0.1 KU/L
ALP SERPL-CCNC: 48 IU/L (ref 44–121)
ALT SERPL-CCNC: 20 IU/L (ref 0–32)
AST SERPL-CCNC: 25 IU/L (ref 0–40)
BASOPHILS # BLD AUTO: 0 X10E3/UL (ref 0–0.2)
BASOPHILS NFR BLD AUTO: 1 %
BILIRUB SERPL-MCNC: 0.3 MG/DL (ref 0–1.2)
BRAZIL NUT IGE QN: <0.1 KU/L
BUN SERPL-MCNC: 13 MG/DL (ref 6–20)
BUN/CREAT SERPL: 14 (ref 9–23)
CALCIUM SERPL-MCNC: 8.9 MG/DL (ref 8.7–10.2)
CASHEW NUT IGE QN: <0.1 KU/L
CHLORIDE SERPL-SCNC: 103 MMOL/L (ref 96–106)
CHOLEST SERPL-MCNC: 142 MG/DL (ref 100–199)
CLAM IGE QN: <0.1 KU/L
CO2 SERPL-SCNC: 19 MMOL/L (ref 20–29)
CODFISH IGE QN: <0.1 KU/L
COW MILK IGE QN: <0.1 KU/L
CRAB IGE QN: <0.1 KU/L
CREAT SERPL-MCNC: 0.94 MG/DL (ref 0.57–1)
EGFR: 87 ML/MIN/1.73
EGG WHITE IGE QN: <0.1 KU/L
EOSINOPHIL # BLD AUTO: 0.1 X10E3/UL (ref 0–0.4)
EOSINOPHIL NFR BLD AUTO: 2 %
ERYTHROCYTE [DISTWIDTH] IN BLOOD BY AUTOMATED COUNT: 11.9 % (ref 11.7–15.4)
GLOBULIN SER-MCNC: 2.4 G/DL (ref 1.5–4.5)
GLUCOSE SERPL-MCNC: 87 MG/DL (ref 70–99)
HAZELNUT IGE QN: <0.1 KU/L
HCT VFR BLD AUTO: 45 % (ref 34–46.6)
HDLC SERPL-MCNC: 59 MG/DL
HGB BLD-MCNC: 14.3 G/DL (ref 11.1–15.9)
IMM GRANULOCYTES # BLD: 0 X10E3/UL (ref 0–0.1)
IMM GRANULOCYTES NFR BLD: 0 %
LDLC SERPL CALC-MCNC: 67 MG/DL (ref 0–99)
LOBSTER IGE QN: <0.1 KU/L
LYMPHOCYTES # BLD AUTO: 2.9 X10E3/UL (ref 0.7–3.1)
LYMPHOCYTES NFR BLD AUTO: 44 %
Lab: NORMAL
MCH RBC QN AUTO: 29.2 PG (ref 26.6–33)
MCHC RBC AUTO-ENTMCNC: 31.8 G/DL (ref 31.5–35.7)
MCV RBC AUTO: 92 FL (ref 79–97)
MONOCYTES # BLD AUTO: 0.4 X10E3/UL (ref 0.1–0.9)
MONOCYTES NFR BLD AUTO: 6 %
NEUTROPHILS # BLD AUTO: 3.2 X10E3/UL (ref 1.4–7)
NEUTROPHILS NFR BLD AUTO: 47 %
OYSTER IGE QN: <0.1 KU/L
PEANUT (RARA H) 6 IGE QN: <0.1 KU/L
PLATELET # BLD AUTO: 235 X10E3/UL (ref 150–450)
POTASSIUM SERPL-SCNC: 3.8 MMOL/L (ref 3.5–5.2)
PROT SERPL-MCNC: 6.7 G/DL (ref 6–8.5)
RBC # BLD AUTO: 4.9 X10E6/UL (ref 3.77–5.28)
SALMON IGE QN: <0.1 KU/L
SCALLOP IGE QN: <0.1 KU/L
SESAME SEED IGE QN: <0.1 KU/L
SHRIMP IGE QN: <0.1 KU/L
SL AMB VLDL CHOLESTEROL CALC: 16 MG/DL (ref 5–40)
SODIUM SERPL-SCNC: 138 MMOL/L (ref 134–144)
SOYBEAN IGE QN: <0.1 KU/L
TRIGL SERPL-MCNC: 86 MG/DL (ref 0–149)
TSH SERPL DL<=0.005 MIU/L-ACNC: 3.28 UIU/ML (ref 0.45–4.5)
TUNA IGE QN: <0.1 KU/L
WALNUT IGE QN: <0.1 KU/L
WBC # BLD AUTO: 6.7 X10E3/UL (ref 3.4–10.8)
WHEAT (RTRI A) 19 IGE QN: <0.1 KU/L
WHEAT IGE QN: <0.1 KU/L

## 2025-03-12 RX ORDER — ERGOCALCIFEROL 1.25 MG/1
50000 CAPSULE, LIQUID FILLED ORAL WEEKLY
Qty: 12 CAPSULE | Refills: 0 | Status: SHIPPED | OUTPATIENT
Start: 2025-03-12

## 2025-04-03 DIAGNOSIS — Z30.41 ENCOUNTER FOR SURVEILLANCE OF CONTRACEPTIVE PILLS: ICD-10-CM

## 2025-04-04 RX ORDER — NORETHINDRONE ACETATE AND ETHINYL ESTRADIOL, AND FERROUS FUMARATE 1MG-20(24)
KIT ORAL
Qty: 84 CAPSULE | Refills: 0 | OUTPATIENT
Start: 2025-04-04

## 2025-04-11 ENCOUNTER — OFFICE VISIT (OUTPATIENT)
Dept: OBGYN CLINIC | Facility: CLINIC | Age: 25
End: 2025-04-11
Payer: COMMERCIAL

## 2025-04-11 VITALS
BODY MASS INDEX: 25.13 KG/M2 | DIASTOLIC BLOOD PRESSURE: 72 MMHG | SYSTOLIC BLOOD PRESSURE: 116 MMHG | WEIGHT: 147.2 LBS | HEIGHT: 64 IN

## 2025-04-11 DIAGNOSIS — Z30.8 ENCOUNTER FOR OTHER CONTRACEPTIVE MANAGEMENT: Primary | ICD-10-CM

## 2025-04-11 PROCEDURE — 99213 OFFICE O/P EST LOW 20 MIN: CPT | Performed by: PHYSICIAN ASSISTANT

## 2025-04-11 RX ORDER — SPIRONOLACTONE 50 MG/1
TABLET, FILM COATED ORAL
COMMUNITY
Start: 2025-04-01

## 2025-04-11 RX ORDER — DAPSONE 75 MG/G
GEL TOPICAL
COMMUNITY
Start: 2025-03-29

## 2025-04-11 NOTE — PROGRESS NOTES
Name: Geraldine Lemon      : 2000      MRN: [id number]  Encounter Provider: Giselle Lee PA-C  Encounter Date: 2025   Encounter department: [facility] [place] [facility]  :  Assessment & Plan  Encounter for other contraceptive management         Discussed progesterone only options for contraception including POPs, Depo Provera injection, Nexplanon, and IUD.  Patient opts for Kyleena IUD.  Counseled on Kyleena.  IUD is effective for up to 5 years.  While most women on an IUD do not get a period, regular periods or irregular bleeding can occur.   IUD does not prevent ovulation, so there is a risk of ectopic pregnancy if a patient conceives while on an IUD.  Can also cause ovarian cysts.  There can be some cramping post insertion. Risks of IUD include pain, bleeding, infection, uterine perforation with need for laparoscopy to retrieve, expulsion, and failure with increased risk of ectopic pregnancy.  IUD can make patients more prone to vaginal infection.  Stressed consistent condom use for STD prevention.  Patient wishes to proceed.  F/u next week for IUD insertion.  Continue OCP until then.  Call with problems.    History of Present Illness   Patient is here to discuss changing her birth control.  Currently taking a combined OCP.  Has been having migraine with aura with her periods. Was recommended by both gyn and PCP to switch to progesterone only method.  Periods have been regular every 28 days, and bleeding lasts for 3-4 days.  She denies heavy bleeding and severe cramping.  Also denies bowel/bladder changes, pelvic pain, bloating, abdominal pain, n/v, and change in appetite.  Interested in Kyleena IUD.      Geraldine Lemon is a 24 y.o. female who presents to discuss changing birth control.  History obtained from: patient    Review of Systems   Constitutional:  Negative for appetite change and unexpected weight change.   Gastrointestinal:  Negative for abdominal distention, abdominal pain,  "constipation, diarrhea, nausea and vomiting.   Genitourinary:  Negative for difficulty urinating, dysuria, frequency, genital sores, hematuria, menstrual problem, pelvic pain, urgency, vaginal bleeding, vaginal discharge and vaginal pain.   Neurological:  Positive for headaches.          Objective   /72 (BP Location: Left arm, Patient Position: Sitting, Cuff Size: Adult)   Ht 5' 4\" (1.626 m)   Wt 66.8 kg (147 lb 3.2 oz)   LMP 04/07/2025 (Exact Date)   BMI 25.27 kg/m²      Physical Exam  Vitals reviewed.   Constitutional:       Appearance: Normal appearance. She is well-developed and normal weight.   Neurological:      Mental Status: She is alert and oriented to person, place, and time.   Psychiatric:         Mood and Affect: Mood normal.         Behavior: Behavior normal. Behavior is cooperative.         Thought Content: Thought content normal.         Judgment: Judgment normal.           "

## 2025-05-29 DIAGNOSIS — E55.9 VITAMIN D DEFICIENCY: ICD-10-CM

## 2025-05-30 RX ORDER — ERGOCALCIFEROL 1.25 MG/1
50000 CAPSULE, LIQUID FILLED ORAL WEEKLY
Qty: 12 CAPSULE | Refills: 0 | OUTPATIENT
Start: 2025-05-30